# Patient Record
Sex: MALE | Race: BLACK OR AFRICAN AMERICAN | NOT HISPANIC OR LATINO | RURAL
[De-identification: names, ages, dates, MRNs, and addresses within clinical notes are randomized per-mention and may not be internally consistent; named-entity substitution may affect disease eponyms.]

---

## 2020-10-12 ENCOUNTER — HISTORICAL (OUTPATIENT)
Dept: ADMINISTRATIVE | Facility: HOSPITAL | Age: 45
End: 2020-10-12

## 2021-08-12 ENCOUNTER — OFFICE VISIT (OUTPATIENT)
Dept: FAMILY MEDICINE | Facility: CLINIC | Age: 46
End: 2021-08-12
Payer: COMMERCIAL

## 2021-08-12 VITALS
BODY MASS INDEX: 25.18 KG/M2 | RESPIRATION RATE: 16 BRPM | DIASTOLIC BLOOD PRESSURE: 98 MMHG | HEART RATE: 72 BPM | TEMPERATURE: 99 F | OXYGEN SATURATION: 97 % | SYSTOLIC BLOOD PRESSURE: 132 MMHG | WEIGHT: 170 LBS | HEIGHT: 69 IN

## 2021-08-12 DIAGNOSIS — Z20.822 LAB TEST NEGATIVE FOR COVID-19 VIRUS: ICD-10-CM

## 2021-08-12 DIAGNOSIS — Z11.52 ENCOUNTER FOR SCREENING FOR COVID-19: Primary | ICD-10-CM

## 2021-08-12 LAB
CTP QC/QA: YES
SARS-COV-2 AG RESP QL IA.RAPID: NEGATIVE

## 2021-08-12 PROCEDURE — 3075F PR MOST RECENT SYSTOLIC BLOOD PRESS GE 130-139MM HG: ICD-10-PCS | Mod: ,,, | Performed by: NURSE PRACTITIONER

## 2021-08-12 PROCEDURE — 3008F BODY MASS INDEX DOCD: CPT | Mod: ,,, | Performed by: NURSE PRACTITIONER

## 2021-08-12 PROCEDURE — 87426 SARS CORONAVIRUS 2 ANTIGEN POCT: ICD-10-PCS | Mod: QW,,, | Performed by: NURSE PRACTITIONER

## 2021-08-12 PROCEDURE — 3075F SYST BP GE 130 - 139MM HG: CPT | Mod: ,,, | Performed by: NURSE PRACTITIONER

## 2021-08-12 PROCEDURE — 1160F PR REVIEW ALL MEDS BY PRESCRIBER/CLIN PHARMACIST DOCUMENTED: ICD-10-PCS | Mod: ,,, | Performed by: NURSE PRACTITIONER

## 2021-08-12 PROCEDURE — 87426 SARSCOV CORONAVIRUS AG IA: CPT | Mod: QW,,, | Performed by: NURSE PRACTITIONER

## 2021-08-12 PROCEDURE — 1160F RVW MEDS BY RX/DR IN RCRD: CPT | Mod: ,,, | Performed by: NURSE PRACTITIONER

## 2021-08-12 PROCEDURE — 1159F MED LIST DOCD IN RCRD: CPT | Mod: ,,, | Performed by: NURSE PRACTITIONER

## 2021-08-12 PROCEDURE — 3008F PR BODY MASS INDEX (BMI) DOCUMENTED: ICD-10-PCS | Mod: ,,, | Performed by: NURSE PRACTITIONER

## 2021-08-12 PROCEDURE — 1159F PR MEDICATION LIST DOCUMENTED IN MEDICAL RECORD: ICD-10-PCS | Mod: ,,, | Performed by: NURSE PRACTITIONER

## 2021-08-12 PROCEDURE — 99212 OFFICE O/P EST SF 10 MIN: CPT | Mod: ,,, | Performed by: NURSE PRACTITIONER

## 2021-08-12 PROCEDURE — 99212 PR OFFICE/OUTPT VISIT, EST, LEVL II, 10-19 MIN: ICD-10-PCS | Mod: ,,, | Performed by: NURSE PRACTITIONER

## 2021-08-12 PROCEDURE — 3080F PR MOST RECENT DIASTOLIC BLOOD PRESSURE >= 90 MM HG: ICD-10-PCS | Mod: ,,, | Performed by: NURSE PRACTITIONER

## 2021-08-12 PROCEDURE — 3080F DIAST BP >= 90 MM HG: CPT | Mod: ,,, | Performed by: NURSE PRACTITIONER

## 2022-09-20 ENCOUNTER — OFFICE VISIT (OUTPATIENT)
Dept: FAMILY MEDICINE | Facility: CLINIC | Age: 47
End: 2022-09-20
Payer: COMMERCIAL

## 2022-09-20 VITALS
HEIGHT: 69 IN | OXYGEN SATURATION: 99 % | TEMPERATURE: 98 F | SYSTOLIC BLOOD PRESSURE: 138 MMHG | HEART RATE: 87 BPM | BODY MASS INDEX: 23.11 KG/M2 | WEIGHT: 156 LBS | DIASTOLIC BLOOD PRESSURE: 92 MMHG | RESPIRATION RATE: 18 BRPM

## 2022-09-20 DIAGNOSIS — J20.9 ACUTE BRONCHITIS, UNSPECIFIED ORGANISM: ICD-10-CM

## 2022-09-20 DIAGNOSIS — J01.90 ACUTE NON-RECURRENT SINUSITIS, UNSPECIFIED LOCATION: Primary | ICD-10-CM

## 2022-09-20 PROCEDURE — 1159F PR MEDICATION LIST DOCUMENTED IN MEDICAL RECORD: ICD-10-PCS | Mod: ,,, | Performed by: FAMILY MEDICINE

## 2022-09-20 PROCEDURE — 3075F SYST BP GE 130 - 139MM HG: CPT | Mod: ,,, | Performed by: FAMILY MEDICINE

## 2022-09-20 PROCEDURE — 1159F MED LIST DOCD IN RCRD: CPT | Mod: ,,, | Performed by: FAMILY MEDICINE

## 2022-09-20 PROCEDURE — 3080F DIAST BP >= 90 MM HG: CPT | Mod: ,,, | Performed by: FAMILY MEDICINE

## 2022-09-20 PROCEDURE — 3008F BODY MASS INDEX DOCD: CPT | Mod: ,,, | Performed by: FAMILY MEDICINE

## 2022-09-20 PROCEDURE — 99213 OFFICE O/P EST LOW 20 MIN: CPT | Mod: 25,,, | Performed by: FAMILY MEDICINE

## 2022-09-20 PROCEDURE — 3080F PR MOST RECENT DIASTOLIC BLOOD PRESSURE >= 90 MM HG: ICD-10-PCS | Mod: ,,, | Performed by: FAMILY MEDICINE

## 2022-09-20 PROCEDURE — 96372 THER/PROPH/DIAG INJ SC/IM: CPT | Mod: ,,, | Performed by: FAMILY MEDICINE

## 2022-09-20 PROCEDURE — 99213 PR OFFICE/OUTPT VISIT, EST, LEVL III, 20-29 MIN: ICD-10-PCS | Mod: 25,,, | Performed by: FAMILY MEDICINE

## 2022-09-20 PROCEDURE — 96372 PR INJECTION,THERAP/PROPH/DIAG2ST, IM OR SUBCUT: ICD-10-PCS | Mod: ,,, | Performed by: FAMILY MEDICINE

## 2022-09-20 PROCEDURE — 3075F PR MOST RECENT SYSTOLIC BLOOD PRESS GE 130-139MM HG: ICD-10-PCS | Mod: ,,, | Performed by: FAMILY MEDICINE

## 2022-09-20 PROCEDURE — 3008F PR BODY MASS INDEX (BMI) DOCUMENTED: ICD-10-PCS | Mod: ,,, | Performed by: FAMILY MEDICINE

## 2022-09-20 RX ORDER — DEXAMETHASONE SODIUM PHOSPHATE 4 MG/ML
6 INJECTION, SOLUTION INTRA-ARTICULAR; INTRALESIONAL; INTRAMUSCULAR; INTRAVENOUS; SOFT TISSUE
Status: COMPLETED | OUTPATIENT
Start: 2022-09-20 | End: 2022-09-20

## 2022-09-20 RX ORDER — AZITHROMYCIN 250 MG/1
TABLET, FILM COATED ORAL
Qty: 6 TABLET | Refills: 0 | Status: ON HOLD | OUTPATIENT
Start: 2022-09-20 | End: 2023-03-01 | Stop reason: CLARIF

## 2022-09-20 RX ORDER — PREDNISONE 20 MG/1
TABLET ORAL
Qty: 10 TABLET | Refills: 0 | Status: ON HOLD | OUTPATIENT
Start: 2022-09-20 | End: 2023-03-01 | Stop reason: CLARIF

## 2022-09-20 RX ADMIN — DEXAMETHASONE SODIUM PHOSPHATE 6 MG: 4 INJECTION, SOLUTION INTRA-ARTICULAR; INTRALESIONAL; INTRAMUSCULAR; INTRAVENOUS; SOFT TISSUE at 10:09

## 2022-09-20 NOTE — PROGRESS NOTES
Subjective:       Patient ID: Robert Sanders is a 46 y.o. male.    Chief Complaint: Cough and Sinus Problem (Patient has a cough and sinus pressure )    Congestion for over week.  Now coughing up a little green yellow sputum.  He had some sinus pressure initially but that is resolved.    Cough    Sinus Problem  Associated symptoms include coughing.   Review of Systems   Respiratory:  Positive for cough.        Objective:      Physical Exam  Constitutional:       Appearance: He is ill-appearing (mildly). He is not toxic-appearing.   HENT:      Right Ear: Tympanic membrane normal.      Left Ear: Tympanic membrane normal.      Nose: Congestion present.      Mouth/Throat:      Pharynx: No posterior oropharyngeal erythema.   Cardiovascular:      Rate and Rhythm: Normal rate and regular rhythm.   Pulmonary:      Effort: Pulmonary effort is normal.      Breath sounds: Rhonchi (few faint scattered rhonchi) present. No wheezing.   Neurological:      Mental Status: He is alert.       Assessment:       Problem List Items Addressed This Visit    None      Plan:       Call if not much better in 2 days

## 2022-12-07 ENCOUNTER — OFFICE VISIT (OUTPATIENT)
Dept: FAMILY MEDICINE | Facility: CLINIC | Age: 47
End: 2022-12-07
Payer: COMMERCIAL

## 2022-12-07 VITALS
SYSTOLIC BLOOD PRESSURE: 136 MMHG | HEIGHT: 69 IN | TEMPERATURE: 99 F | BODY MASS INDEX: 23.7 KG/M2 | OXYGEN SATURATION: 98 % | DIASTOLIC BLOOD PRESSURE: 90 MMHG | HEART RATE: 85 BPM | WEIGHT: 160 LBS

## 2022-12-07 DIAGNOSIS — J01.00 ACUTE NON-RECURRENT MAXILLARY SINUSITIS: Primary | ICD-10-CM

## 2022-12-07 DIAGNOSIS — F41.1 GAD (GENERALIZED ANXIETY DISORDER): ICD-10-CM

## 2022-12-07 DIAGNOSIS — R05.9 COUGH, UNSPECIFIED TYPE: ICD-10-CM

## 2022-12-07 PROCEDURE — 96372 PR INJECTION,THERAP/PROPH/DIAG2ST, IM OR SUBCUT: ICD-10-PCS | Mod: ,,, | Performed by: NURSE PRACTITIONER

## 2022-12-07 PROCEDURE — 99213 OFFICE O/P EST LOW 20 MIN: CPT | Mod: 25,,, | Performed by: NURSE PRACTITIONER

## 2022-12-07 PROCEDURE — 1160F RVW MEDS BY RX/DR IN RCRD: CPT | Mod: ,,, | Performed by: NURSE PRACTITIONER

## 2022-12-07 PROCEDURE — 1160F PR REVIEW ALL MEDS BY PRESCRIBER/CLIN PHARMACIST DOCUMENTED: ICD-10-PCS | Mod: ,,, | Performed by: NURSE PRACTITIONER

## 2022-12-07 PROCEDURE — 1159F PR MEDICATION LIST DOCUMENTED IN MEDICAL RECORD: ICD-10-PCS | Mod: ,,, | Performed by: NURSE PRACTITIONER

## 2022-12-07 PROCEDURE — 99213 PR OFFICE/OUTPT VISIT, EST, LEVL III, 20-29 MIN: ICD-10-PCS | Mod: 25,,, | Performed by: NURSE PRACTITIONER

## 2022-12-07 PROCEDURE — 3075F SYST BP GE 130 - 139MM HG: CPT | Mod: ,,, | Performed by: NURSE PRACTITIONER

## 2022-12-07 PROCEDURE — 3008F BODY MASS INDEX DOCD: CPT | Mod: ,,, | Performed by: NURSE PRACTITIONER

## 2022-12-07 PROCEDURE — 3008F PR BODY MASS INDEX (BMI) DOCUMENTED: ICD-10-PCS | Mod: ,,, | Performed by: NURSE PRACTITIONER

## 2022-12-07 PROCEDURE — 3075F PR MOST RECENT SYSTOLIC BLOOD PRESS GE 130-139MM HG: ICD-10-PCS | Mod: ,,, | Performed by: NURSE PRACTITIONER

## 2022-12-07 PROCEDURE — 3080F DIAST BP >= 90 MM HG: CPT | Mod: ,,, | Performed by: NURSE PRACTITIONER

## 2022-12-07 PROCEDURE — 3080F PR MOST RECENT DIASTOLIC BLOOD PRESSURE >= 90 MM HG: ICD-10-PCS | Mod: ,,, | Performed by: NURSE PRACTITIONER

## 2022-12-07 PROCEDURE — 1159F MED LIST DOCD IN RCRD: CPT | Mod: ,,, | Performed by: NURSE PRACTITIONER

## 2022-12-07 PROCEDURE — 96372 THER/PROPH/DIAG INJ SC/IM: CPT | Mod: ,,, | Performed by: NURSE PRACTITIONER

## 2022-12-07 RX ORDER — CLONAZEPAM 1 MG/1
1 TABLET ORAL DAILY PRN
Qty: 30 TABLET | Refills: 1 | Status: SHIPPED | OUTPATIENT
Start: 2022-12-07

## 2022-12-07 RX ORDER — DEXAMETHASONE SODIUM PHOSPHATE 4 MG/ML
8 INJECTION, SOLUTION INTRA-ARTICULAR; INTRALESIONAL; INTRAMUSCULAR; INTRAVENOUS; SOFT TISSUE
Status: COMPLETED | OUTPATIENT
Start: 2022-12-07 | End: 2022-12-07

## 2022-12-07 RX ORDER — METHYLPREDNISOLONE 4 MG/1
TABLET ORAL
Qty: 21 EACH | Refills: 0 | Status: SHIPPED | OUTPATIENT
Start: 2022-12-07 | End: 2022-12-28

## 2022-12-07 RX ORDER — IPRATROPIUM BROMIDE 42 UG/1
2 SPRAY, METERED NASAL 3 TIMES DAILY
Qty: 15 ML | Refills: 0 | Status: ON HOLD | OUTPATIENT
Start: 2022-12-07 | End: 2023-03-01 | Stop reason: CLARIF

## 2022-12-07 RX ORDER — BENZONATATE 200 MG/1
200 CAPSULE ORAL 3 TIMES DAILY PRN
Qty: 30 CAPSULE | Refills: 0 | Status: SHIPPED | OUTPATIENT
Start: 2022-12-07 | End: 2022-12-17

## 2022-12-07 RX ORDER — AMOXICILLIN AND CLAVULANATE POTASSIUM 875; 125 MG/1; MG/1
1 TABLET, FILM COATED ORAL EVERY 12 HOURS
Qty: 20 TABLET | Refills: 0 | Status: SHIPPED | OUTPATIENT
Start: 2022-12-07 | End: 2022-12-17

## 2022-12-07 RX ADMIN — DEXAMETHASONE SODIUM PHOSPHATE 8 MG: 4 INJECTION, SOLUTION INTRA-ARTICULAR; INTRALESIONAL; INTRAMUSCULAR; INTRAVENOUS; SOFT TISSUE at 10:12

## 2022-12-07 NOTE — PROGRESS NOTES
Subjective:       Patient ID: Robert Sanders is a 47 y.o. male.    Chief Complaint: Sinus Infection (Congestion, runny nose, prod cough, BA, chillls, felt warm yesterday. OTC meds taken with little to no help.)    Pt presents with sinus congestion for 3 days and follow up on JUSTIN.    Review of Systems   Constitutional: Negative.    HENT:  Positive for nasal congestion, rhinorrhea and sinus pressure/congestion.    Eyes: Negative.    Respiratory:  Positive for cough.    Cardiovascular: Negative.    Gastrointestinal: Negative.    Endocrine: Negative.    Genitourinary: Negative.    Musculoskeletal: Negative.    Integumentary:  Negative.   Allergic/Immunologic: Negative.    Neurological: Negative.    Hematological: Negative.    Psychiatric/Behavioral: Negative.         Objective:      Physical Exam  Vitals and nursing note reviewed.   Constitutional:       General: He is not in acute distress.     Appearance: Normal appearance. He is not ill-appearing.   HENT:      Head: Normocephalic.      Right Ear: External ear normal.      Left Ear: External ear normal.      Nose: Congestion and rhinorrhea present.      Right Sinus: Maxillary sinus tenderness present.      Left Sinus: Maxillary sinus tenderness present.      Mouth/Throat:      Mouth: Mucous membranes are moist.      Pharynx: Posterior oropharyngeal erythema present.   Eyes:      Conjunctiva/sclera: Conjunctivae normal.   Cardiovascular:      Rate and Rhythm: Normal rate and regular rhythm.      Pulses: Normal pulses.      Heart sounds: Normal heart sounds. No murmur heard.    No friction rub. No gallop.   Pulmonary:      Effort: Pulmonary effort is normal. No respiratory distress.      Breath sounds: Normal breath sounds. No stridor. No wheezing, rhonchi or rales.   Chest:      Chest wall: No tenderness.   Abdominal:      General: Abdomen is flat. There is no distension.   Musculoskeletal:         General: No swelling or tenderness. Normal range of motion.       Cervical back: Neck supple.      Right lower leg: No edema.      Left lower leg: No edema.   Skin:     General: Skin is warm and dry.      Coloration: Skin is not jaundiced or pale.      Findings: No erythema or rash.   Neurological:      General: No focal deficit present.      Mental Status: He is alert and oriented to person, place, and time. Mental status is at baseline.      Motor: No weakness.      Gait: Gait normal.   Psychiatric:         Mood and Affect: Mood normal.         Behavior: Behavior normal.         Thought Content: Thought content normal.         Judgment: Judgment normal.       Assessment:       Problem List Items Addressed This Visit    None  Visit Diagnoses       Acute non-recurrent maxillary sinusitis    -  Primary    Relevant Medications    dexAMETHasone injection 8 mg (Completed)    amoxicillin-clavulanate 875-125mg (AUGMENTIN) 875-125 mg per tablet    methylPREDNISolone (MEDROL DOSEPACK) 4 mg tablet    ipratropium (ATROVENT) 42 mcg (0.06 %) nasal spray    Cough, unspecified type        Relevant Medications    dexAMETHasone injection 8 mg (Completed)    methylPREDNISolone (MEDROL DOSEPACK) 4 mg tablet    benzonatate (TESSALON) 200 MG capsule    JUSTIN (generalized anxiety disorder)        Relevant Medications    clonazePAM (KLONOPIN) 1 MG tablet              Plan:       Treat sinusitis as above  Refill clonazepam prn; pt has occasional panic attacks/high anxiety; f/u with pcp advised.

## 2023-01-31 ENCOUNTER — OFFICE VISIT (OUTPATIENT)
Dept: FAMILY MEDICINE | Facility: CLINIC | Age: 48
End: 2023-01-31
Payer: COMMERCIAL

## 2023-01-31 VITALS
HEART RATE: 78 BPM | WEIGHT: 157 LBS | BODY MASS INDEX: 23.25 KG/M2 | DIASTOLIC BLOOD PRESSURE: 98 MMHG | SYSTOLIC BLOOD PRESSURE: 163 MMHG | OXYGEN SATURATION: 98 % | HEIGHT: 69 IN

## 2023-01-31 DIAGNOSIS — J32.9 SINUSITIS, UNSPECIFIED CHRONICITY, UNSPECIFIED LOCATION: Primary | ICD-10-CM

## 2023-01-31 PROCEDURE — 1160F RVW MEDS BY RX/DR IN RCRD: CPT | Mod: ,,, | Performed by: FAMILY MEDICINE

## 2023-01-31 PROCEDURE — 96372 PR INJECTION,THERAP/PROPH/DIAG2ST, IM OR SUBCUT: ICD-10-PCS | Mod: ,,, | Performed by: FAMILY MEDICINE

## 2023-01-31 PROCEDURE — 96372 THER/PROPH/DIAG INJ SC/IM: CPT | Mod: ,,, | Performed by: FAMILY MEDICINE

## 2023-01-31 PROCEDURE — 1159F PR MEDICATION LIST DOCUMENTED IN MEDICAL RECORD: ICD-10-PCS | Mod: ,,, | Performed by: FAMILY MEDICINE

## 2023-01-31 PROCEDURE — 1159F MED LIST DOCD IN RCRD: CPT | Mod: ,,, | Performed by: FAMILY MEDICINE

## 2023-01-31 PROCEDURE — 3077F SYST BP >= 140 MM HG: CPT | Mod: ,,, | Performed by: FAMILY MEDICINE

## 2023-01-31 PROCEDURE — 1160F PR REVIEW ALL MEDS BY PRESCRIBER/CLIN PHARMACIST DOCUMENTED: ICD-10-PCS | Mod: ,,, | Performed by: FAMILY MEDICINE

## 2023-01-31 PROCEDURE — 3077F PR MOST RECENT SYSTOLIC BLOOD PRESSURE >= 140 MM HG: ICD-10-PCS | Mod: ,,, | Performed by: FAMILY MEDICINE

## 2023-01-31 PROCEDURE — 99214 PR OFFICE/OUTPT VISIT, EST, LEVL IV, 30-39 MIN: ICD-10-PCS | Mod: 25,,, | Performed by: FAMILY MEDICINE

## 2023-01-31 PROCEDURE — 3080F DIAST BP >= 90 MM HG: CPT | Mod: ,,, | Performed by: FAMILY MEDICINE

## 2023-01-31 PROCEDURE — 99214 OFFICE O/P EST MOD 30 MIN: CPT | Mod: 25,,, | Performed by: FAMILY MEDICINE

## 2023-01-31 PROCEDURE — 3080F PR MOST RECENT DIASTOLIC BLOOD PRESSURE >= 90 MM HG: ICD-10-PCS | Mod: ,,, | Performed by: FAMILY MEDICINE

## 2023-01-31 PROCEDURE — 3008F BODY MASS INDEX DOCD: CPT | Mod: ,,, | Performed by: FAMILY MEDICINE

## 2023-01-31 PROCEDURE — 3008F PR BODY MASS INDEX (BMI) DOCUMENTED: ICD-10-PCS | Mod: ,,, | Performed by: FAMILY MEDICINE

## 2023-01-31 RX ORDER — CEFTRIAXONE 1 G/1
1 INJECTION, POWDER, FOR SOLUTION INTRAMUSCULAR; INTRAVENOUS
Status: COMPLETED | OUTPATIENT
Start: 2023-01-31 | End: 2023-01-31

## 2023-01-31 RX ORDER — PREDNISONE 20 MG/1
40 TABLET ORAL DAILY
Qty: 10 TABLET | Refills: 0 | Status: SHIPPED | OUTPATIENT
Start: 2023-01-31 | End: 2023-02-05

## 2023-01-31 RX ORDER — DEXAMETHASONE SODIUM PHOSPHATE 4 MG/ML
6 INJECTION, SOLUTION INTRA-ARTICULAR; INTRALESIONAL; INTRAMUSCULAR; INTRAVENOUS; SOFT TISSUE
Status: COMPLETED | OUTPATIENT
Start: 2023-01-31 | End: 2023-01-31

## 2023-01-31 RX ORDER — AMOXICILLIN AND CLAVULANATE POTASSIUM 875; 125 MG/1; MG/1
1 TABLET, FILM COATED ORAL 2 TIMES DAILY
Qty: 14 TABLET | Refills: 0 | Status: SHIPPED | OUTPATIENT
Start: 2023-01-31 | End: 2023-02-07

## 2023-01-31 RX ADMIN — DEXAMETHASONE SODIUM PHOSPHATE 6 MG: 4 INJECTION, SOLUTION INTRA-ARTICULAR; INTRALESIONAL; INTRAMUSCULAR; INTRAVENOUS; SOFT TISSUE at 02:01

## 2023-01-31 RX ADMIN — CEFTRIAXONE 1 G: 1 INJECTION, POWDER, FOR SOLUTION INTRAMUSCULAR; INTRAVENOUS at 02:01

## 2023-01-31 NOTE — PROGRESS NOTES
Subjective:       Patient ID: Robert Sanders is a 47 y.o. male.    Chief Complaint: Sinusitis (Patient thinks he has a sinus infection, really congested, eye pressure, casual cough, really thick mucus, feeling fatigue. Stared taking dayquil last Thursday and it hasn't gotten any better.)    HPI  Review of Systems   Constitutional:  Negative for activity change, appetite change, chills, diaphoresis, fatigue, fever and unexpected weight change.   HENT:  Positive for nasal congestion, postnasal drip, rhinorrhea and sinus pressure/congestion. Negative for dental problem, drooling, ear discharge, ear pain, facial swelling, hearing loss, mouth sores, nosebleeds, sneezing, sore throat, tinnitus, trouble swallowing, voice change and goiter.    Eyes:  Negative for photophobia, pain, discharge, redness, itching and visual disturbance.   Respiratory:  Positive for cough. Negative for apnea, choking, chest tightness, shortness of breath, wheezing and stridor.    Cardiovascular:  Negative for chest pain, palpitations, leg swelling and claudication.   Gastrointestinal:  Negative for abdominal distention, abdominal pain, anal bleeding, blood in stool, change in bowel habit, constipation, diarrhea, nausea, vomiting, reflux, fecal incontinence and change in bowel habit.   Endocrine: Negative for cold intolerance, heat intolerance, polydipsia, polyphagia and polyuria.   Genitourinary:  Negative for bladder incontinence, decreased urine volume, difficulty urinating, discharge, dysuria, enuresis, erectile dysfunction, flank pain, frequency, genital sores, hematuria, penile pain, testicular pain and urgency.   Musculoskeletal:  Negative for arthralgias, back pain, gait problem, joint swelling, leg pain, myalgias, neck pain, neck stiffness and joint deformity.   Integumentary:  Negative for pallor, rash, wound and mole/lesion.   Allergic/Immunologic: Negative for environmental allergies, food allergies and frequent infections.    Neurological:  Negative for dizziness, vertigo, tremors, seizures, syncope, facial asymmetry, speech difficulty, weakness, light-headedness, numbness, headaches, coordination difficulties, memory loss and coordination difficulties.   Hematological:  Negative for adenopathy. Does not bruise/bleed easily.   Psychiatric/Behavioral:  Negative for agitation, behavioral problems, confusion, decreased concentration, dysphoric mood, hallucinations, self-injury, sleep disturbance and suicidal ideas. The patient is not nervous/anxious and is not hyperactive.        Objective:      Physical Exam  Vitals reviewed.   Constitutional:       Appearance: Normal appearance. He is normal weight.   HENT:      Head: Normocephalic and atraumatic.      Right Ear: Tympanic membrane and ear canal normal.      Left Ear: Tympanic membrane, ear canal and external ear normal.      Nose: Congestion and rhinorrhea present.      Mouth/Throat:      Mouth: Mucous membranes are moist.      Pharynx: Oropharynx is clear. Posterior oropharyngeal erythema present.   Eyes:      Extraocular Movements: Extraocular movements intact.      Conjunctiva/sclera: Conjunctivae normal.      Pupils: Pupils are equal, round, and reactive to light.   Cardiovascular:      Rate and Rhythm: Normal rate and regular rhythm.      Pulses: Normal pulses.      Heart sounds: Normal heart sounds.   Pulmonary:      Effort: Pulmonary effort is normal.      Breath sounds: Normal breath sounds.   Abdominal:      General: Abdomen is flat. Bowel sounds are normal.      Palpations: Abdomen is soft.   Musculoskeletal:         General: Normal range of motion.      Cervical back: Normal range of motion and neck supple.   Skin:     General: Skin is warm and dry.   Neurological:      General: No focal deficit present.      Mental Status: He is alert and oriented to person, place, and time. Mental status is at baseline.   Psychiatric:         Mood and Affect: Mood normal.         Behavior:  Behavior normal.         Thought Content: Thought content normal.         Judgment: Judgment normal.       Assessment:       1. Sinusitis, unspecified chronicity, unspecified location        Plan:     Sinusitis, unspecified chronicity, unspecified location  -     cefTRIAXone injection 1 g  -     dexAMETHasone injection 6 mg  -     amoxicillin-clavulanate 875-125mg (AUGMENTIN) 875-125 mg per tablet; Take 1 tablet by mouth 2 (two) times a day. for 7 days  Dispense: 14 tablet; Refill: 0  -     predniSONE (DELTASONE) 20 MG tablet; Take 2 tablets (40 mg total) by mouth once daily. for 5 days  Dispense: 10 tablet; Refill: 0

## 2023-02-06 ENCOUNTER — OFFICE VISIT (OUTPATIENT)
Dept: FAMILY MEDICINE | Facility: CLINIC | Age: 48
End: 2023-02-06
Payer: COMMERCIAL

## 2023-02-06 VITALS
SYSTOLIC BLOOD PRESSURE: 160 MMHG | DIASTOLIC BLOOD PRESSURE: 94 MMHG | RESPIRATION RATE: 18 BRPM | HEART RATE: 88 BPM | WEIGHT: 156 LBS | OXYGEN SATURATION: 98 % | TEMPERATURE: 99 F | HEIGHT: 69 IN | BODY MASS INDEX: 23.11 KG/M2

## 2023-02-06 DIAGNOSIS — M25.562 ACUTE PAIN OF LEFT KNEE: Primary | ICD-10-CM

## 2023-02-06 PROCEDURE — 1159F MED LIST DOCD IN RCRD: CPT | Mod: ,,, | Performed by: FAMILY MEDICINE

## 2023-02-06 PROCEDURE — 99213 OFFICE O/P EST LOW 20 MIN: CPT | Mod: ,,, | Performed by: FAMILY MEDICINE

## 2023-02-06 PROCEDURE — 1159F PR MEDICATION LIST DOCUMENTED IN MEDICAL RECORD: ICD-10-PCS | Mod: ,,, | Performed by: FAMILY MEDICINE

## 2023-02-06 PROCEDURE — 3008F BODY MASS INDEX DOCD: CPT | Mod: ,,, | Performed by: FAMILY MEDICINE

## 2023-02-06 PROCEDURE — 3008F PR BODY MASS INDEX (BMI) DOCUMENTED: ICD-10-PCS | Mod: ,,, | Performed by: FAMILY MEDICINE

## 2023-02-06 PROCEDURE — 99213 PR OFFICE/OUTPT VISIT, EST, LEVL III, 20-29 MIN: ICD-10-PCS | Mod: ,,, | Performed by: FAMILY MEDICINE

## 2023-02-06 PROCEDURE — 3077F PR MOST RECENT SYSTOLIC BLOOD PRESSURE >= 140 MM HG: ICD-10-PCS | Mod: ,,, | Performed by: FAMILY MEDICINE

## 2023-02-06 PROCEDURE — 3080F DIAST BP >= 90 MM HG: CPT | Mod: ,,, | Performed by: FAMILY MEDICINE

## 2023-02-06 PROCEDURE — 3080F PR MOST RECENT DIASTOLIC BLOOD PRESSURE >= 90 MM HG: ICD-10-PCS | Mod: ,,, | Performed by: FAMILY MEDICINE

## 2023-02-06 PROCEDURE — 3077F SYST BP >= 140 MM HG: CPT | Mod: ,,, | Performed by: FAMILY MEDICINE

## 2023-02-06 NOTE — PROGRESS NOTES
Subjective:       Patient ID: Robert Sanders is a 47 y.o. male.    Chief Complaint: Knee Pain (Left knee kira. Knee went out on him Saturday)    Patient said he was walking in his left knee gave out.  This occurred 2 days ago.  He does not recall the details.  He said he was simply walking.  He is never had trouble with his knee before.  Now with sharp pain medial aspect of his knee with certain activity.  Always when walking.  He said if he twists his foot a certain way he has severe pain.    Knee Pain     Review of Systems      Objective:      Physical Exam  Constitutional:       General: He is in acute distress.      Appearance: He is not ill-appearing.   Musculoskeletal:      Left knee: No swelling, ecchymosis or crepitus. Tenderness present over the MCL. No LCL laxity or MCL laxity.Normal alignment and normal patellar mobility.      Instability Tests: Anterior drawer test negative. Posterior drawer test negative. Medial Jessie test negative and lateral Jessie test negative.      Right lower leg: No edema.      Left lower leg: No swelling. No edema.      Comments: Negative Tanya.  Surprisingly   Skin:     Findings: No lesion or rash.   Neurological:      Mental Status: He is alert.       Assessment:       Problem List Items Addressed This Visit    None  Visit Diagnoses       Acute pain of left knee    -  Primary    Relevant Orders    X-Ray Knee 1 or 2 View Left (Completed)    Ambulatory referral/consult to Orthopedics            Plan:       Knee x-ray is normal.  Likely meniscal injury.  Refer to orthopedics.  Ibuprofen as needed.

## 2023-02-14 ENCOUNTER — OFFICE VISIT (OUTPATIENT)
Dept: ORTHOPEDICS | Facility: CLINIC | Age: 48
End: 2023-02-14
Payer: COMMERCIAL

## 2023-02-14 DIAGNOSIS — M25.562 ACUTE PAIN OF LEFT KNEE: ICD-10-CM

## 2023-02-14 DIAGNOSIS — M25.552 LEFT HIP PAIN: Primary | ICD-10-CM

## 2023-02-14 PROCEDURE — 99203 PR OFFICE/OUTPT VISIT, NEW, LEVL III, 30-44 MIN: ICD-10-PCS | Mod: S$PBB,,, | Performed by: NURSE PRACTITIONER

## 2023-02-14 PROCEDURE — 99203 OFFICE O/P NEW LOW 30 MIN: CPT | Mod: S$PBB,,, | Performed by: NURSE PRACTITIONER

## 2023-02-14 PROCEDURE — 99213 OFFICE O/P EST LOW 20 MIN: CPT | Mod: PBBFAC | Performed by: NURSE PRACTITIONER

## 2023-02-14 NOTE — PROGRESS NOTES
HPI:   Robert Sanders is a pleasant 47 y.o. patient who reports to clinic for evaluation of knee pain.  Reports pain started about 2 weeks ago.  He had a fall landing on his left knee.  Reports he was unable to walk or bear weight after.  Has been using crutches.  He does not feel like the pain has improved in the past 2 weeks.  Denies swelling.  Reports most of his pain  is medially  Injury onset and description: fall   Patient's occupation:   This is not a work related injury.   This injury has been non-responsive to conservative care. The pain is worse with repetitive use, and strenuous activity is very difficult.  his pain improves with rest.  he rates pain as a  4/10on the Visual Analog Scale.        PAST MEDICAL HISTORY:   No past medical history on file.  PAST SURGICAL HISTORY:   No past surgical history on file.  MEDICATIONS:    Current Outpatient Medications:     azithromycin (ZITHROMAX Z-MELA) 250 MG tablet, Two today then 1 daily, Disp: 6 tablet, Rfl: 0    clonazePAM (KLONOPIN) 1 MG tablet, Take 1 tablet (1 mg total) by mouth daily as needed for Anxiety., Disp: 30 tablet, Rfl: 1    ipratropium (ATROVENT) 42 mcg (0.06 %) nasal spray, 2 sprays by Each Nostril route 3 (three) times daily., Disp: 15 mL, Rfl: 0    predniSONE (DELTASONE) 20 MG tablet, Two every morning, Disp: 10 tablet, Rfl: 0  ALLERGIES:   Review of patient's allergies indicates:  No Known Allergies      PHYSICAL EXAM:  VITAL SIGNS: There were no vitals taken for this visit.  General: Well-developed well-nourished 47 y.o. malein no acute distress;Cardiovascular: Regular rhythm by palpation of distal pulse, normal color and temperature, no concerning varicosities on symptomatic side Lungs: No labored breathing or wheezing appreciated Neuro: Alert and oriented ×3 Psychiatric: well oriented to person, place and time, demonstrates normal mood and affect Skin: No rashes, lesions or ulcers, normal temperature, turgor, and texture on  uninvolved extremity    General    Constitutional: He is oriented to person, place, and time. He appears well-nourished.   HENT:   Head: Normocephalic and atraumatic.   Eyes: Pupils are equal, round, and reactive to light.   Neck: Neck supple.   Cardiovascular:  Normal rate and regular rhythm.            Pulmonary/Chest: Effort normal. No respiratory distress.   Abdominal: There is no abdominal tenderness. There is no guarding.   Neurological: He is alert and oriented to person, place, and time. He has normal reflexes.   Psychiatric: He has a normal mood and affect. His behavior is normal. Judgment and thought content normal.             Left Knee Exam     Inspection   Swelling: absent  Effusion: absent  Bruising: absent    Tenderness   The patient tender to palpation of the medial joint line and lateral joint line.    Range of Motion   Extension:  abnormal   Flexion:  abnormal     Tests   Meniscus   Jessie:  Medial - positive Lateral - positive  Stability   Lachman: normal (-1 to 2mm)   PCL-Posterior Drawer: normal (0 to 2mm)  Patella   Patellar apprehension: negative  Patellar Tracking: normal    Other   Sensation: normal    Muscle Strength   Left Lower Extremity   Quadriceps:  4/5   Hamstrin/5     Vascular Exam       Left Pulses  Dorsalis Pedis:      2+  Posterior Tibial:      2+        IMAGING:  X-Ray Knee 1 or 2 View Left    Result Date: 2023  EXAMINATION: XR KNEE 1 OR 2 VIEW LEFT CLINICAL HISTORY: Pain in left knee COMPARISON: None. FINDINGS: Left knee, PA and lateral views: The joint spaces are normal.  There is no evidence of any effusion or fracture.     Negative exam. [facility] of service: [facility] Electronically signed by: Anika Herbert Date:    2023 Time:    13:22        ASSESSMENT:      ICD-10-CM ICD-9-CM   1. Acute pain of left knee  M25.562 719.46       PLAN:     -Findings and treatment options were discussed with the patient  -All questions answered  MRI left knee  RTC  with Dr Zelaya after to review results  OTC NSAIDs as needed    There are no Patient Instructions on file for this visit.  No orders of the defined types were placed in this encounter.    Procedures

## 2023-02-21 ENCOUNTER — OFFICE VISIT (OUTPATIENT)
Dept: ORTHOPEDICS | Facility: CLINIC | Age: 48
End: 2023-02-21
Payer: COMMERCIAL

## 2023-02-21 DIAGNOSIS — S83.232A COMPLEX TEAR OF MEDIAL MENISCUS OF LEFT KNEE AS CURRENT INJURY, INITIAL ENCOUNTER: Primary | ICD-10-CM

## 2023-02-21 PROCEDURE — 99214 PR OFFICE/OUTPT VISIT, EST, LEVL IV, 30-39 MIN: ICD-10-PCS | Mod: S$PBB,,, | Performed by: ORTHOPAEDIC SURGERY

## 2023-02-21 PROCEDURE — 99212 OFFICE O/P EST SF 10 MIN: CPT | Mod: PBBFAC | Performed by: ORTHOPAEDIC SURGERY

## 2023-02-21 PROCEDURE — 99214 OFFICE O/P EST MOD 30 MIN: CPT | Mod: S$PBB,,, | Performed by: ORTHOPAEDIC SURGERY

## 2023-02-21 NOTE — PROGRESS NOTES
CC:  Knee pain    47 y.o. Male returns to clinic for a follow up visit regarding knee pain.       Pt is here for MRI f/u left knee and treatment moving forward.       No past medical history on file.  No past surgical history on file.      PHYSICAL EXAMINATION:  There were no vitals taken for this visit.  General    Constitutional: He is oriented to person, place, and time. He appears well-developed and well-nourished.   HENT:   Head: Normocephalic and atraumatic.   Nose: Nose normal.   Eyes: EOM are normal. Pupils are equal, round, and reactive to light.   Cardiovascular:  Normal rate and intact distal pulses.            Pulmonary/Chest: Effort normal. No respiratory distress. He exhibits no tenderness.   Abdominal: Soft. He exhibits no distension. There is no abdominal tenderness.   Neurological: He is alert and oriented to person, place, and time. He has normal reflexes.   Psychiatric: He has a normal mood and affect. His behavior is normal. Judgment and thought content normal.             Left Knee Exam     Inspection   Swelling: present  Effusion: present  Deformity: absent    Tenderness   The patient tender to palpation of the medial joint line.    Crepitus   The patient has crepitus of the medial joint line.    Range of Motion   Extension:  abnormal   Flexion:  abnormal     Tests   Meniscus   Jessie:  Medial - positive     Other   Meniscal Cyst: present  Popliteal (Baker's) Cyst: absent    Comments:  Pain with Thessaly Maneuver.       IMAGING:  X-Ray Knee 1 or 2 View Left    Result Date: 2/6/2023  EXAMINATION: XR KNEE 1 OR 2 VIEW LEFT CLINICAL HISTORY: Pain in left knee COMPARISON: None. FINDINGS: Left knee, PA and lateral views: The joint spaces are normal.  There is no evidence of any effusion or fracture.     Negative exam. Place of service: [facility] Electronically signed by: Anika Herbert Date:    02/06/2023 Time:    13:22       I reviewed his MRI from the imaging center.  This  demonstrates the presence of a complex tear of the posterior horn of the medial meniscus.  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 Complex tear of medial meniscus of left knee as current injury, initial encounter  S83.232A 836.0       PLAN:     -Findings and treatment options were discussed with the patient  -All questions answered  The conservative options including NSAIDs, activity modification, physical therapy, corticosteroid injection, and viscosupplimentation/ Platelet rich plasma injections were discussed. he is interested in surgical intervention at this time, as conservative measures up to this point have not fully relieved or resolved his symptoms.    The surgical process of knee arthroscopy was discussed in detail with the patient including a detailed discussion of the procedure itself (including visual model, x-ray review, and literature review). The typical perioperative and post-operative course was discussed and perioperative risks were discussed to the patient's satisfaction.  Risks and complications discussed included but were not limited to the risks of anesthetic complications, infection, bleeding, wound healing complications, instability, limb length inequality, neurologic dysfunction including numbness,  DVT, pulmonary embolism, perioperative medical risks (cardiac, pulmonary, renal, neurologic), and death and the patient elects to proceed. We will initiate pre-operative medical evaluation and set a provisional date for surgical intervention according to the patient's schedule. 25 minutes was spent in direct consultation with the patient counselling him on the items listed above.  Will plan for repair of this complex medial meniscus tear.  Plan will be left knee arthroscopy.    There are no Patient Instructions on file for this  visit.      No orders of the defined types were placed in this encounter.        Procedures

## 2023-02-22 DIAGNOSIS — S83.232A COMPLEX TEAR OF MEDIAL MENISCUS OF LEFT KNEE AS CURRENT INJURY, INITIAL ENCOUNTER: Primary | ICD-10-CM

## 2023-02-22 DIAGNOSIS — S83.232A COMPLEX TEAR OF MEDIAL MENISCUS OF LEFT KNEE: ICD-10-CM

## 2023-02-22 DIAGNOSIS — Z01.818 PREPROCEDURAL EXAMINATION: Primary | ICD-10-CM

## 2023-02-22 RX ORDER — MUPIROCIN 20 MG/G
OINTMENT TOPICAL
Status: CANCELLED | OUTPATIENT
Start: 2023-02-22

## 2023-02-22 RX ORDER — SODIUM CHLORIDE 9 MG/ML
INJECTION, SOLUTION INTRAVENOUS CONTINUOUS
Status: CANCELLED | OUTPATIENT
Start: 2023-02-22

## 2023-02-24 ENCOUNTER — HOSPITAL ENCOUNTER (OUTPATIENT)
Dept: CARDIOLOGY | Facility: HOSPITAL | Age: 48
Discharge: HOME | End: 2023-02-24
Attending: ORTHOPAEDIC SURGERY
Payer: COMMERCIAL

## 2023-02-24 DIAGNOSIS — Z01.818 PREPROCEDURAL EXAMINATION: ICD-10-CM

## 2023-02-24 PROCEDURE — 93010 ELECTROCARDIOGRAM REPORT: CPT | Mod: ,,, | Performed by: HOSPITALIST

## 2023-02-24 PROCEDURE — 93010 EKG 12-LEAD: ICD-10-PCS | Mod: ,,, | Performed by: HOSPITALIST

## 2023-02-24 PROCEDURE — 93005 ELECTROCARDIOGRAM TRACING: CPT

## 2023-03-01 ENCOUNTER — ANESTHESIA (OUTPATIENT)
Dept: SURGERY | Facility: HOSPITAL | Age: 48
End: 2023-03-01
Payer: COMMERCIAL

## 2023-03-01 ENCOUNTER — HOSPITAL ENCOUNTER (OUTPATIENT)
Facility: HOSPITAL | Age: 48
Discharge: HOME | End: 2023-03-01
Attending: ORTHOPAEDIC SURGERY | Admitting: ORTHOPAEDIC SURGERY
Payer: COMMERCIAL

## 2023-03-01 ENCOUNTER — ANESTHESIA EVENT (OUTPATIENT)
Dept: SURGERY | Facility: HOSPITAL | Age: 48
End: 2023-03-01
Payer: COMMERCIAL

## 2023-03-01 VITALS
SYSTOLIC BLOOD PRESSURE: 145 MMHG | WEIGHT: 160 LBS | DIASTOLIC BLOOD PRESSURE: 91 MMHG | TEMPERATURE: 98 F | HEIGHT: 69 IN | HEART RATE: 77 BPM | BODY MASS INDEX: 23.7 KG/M2 | RESPIRATION RATE: 11 BRPM | OXYGEN SATURATION: 94 %

## 2023-03-01 DIAGNOSIS — S83.232A COMPLEX TEAR OF MEDIAL MENISCUS OF LEFT KNEE AS CURRENT INJURY, INITIAL ENCOUNTER: ICD-10-CM

## 2023-03-01 DIAGNOSIS — S83.232A COMPLEX TEAR OF MEDIAL MENISCUS OF LEFT KNEE: Primary | ICD-10-CM

## 2023-03-01 PROCEDURE — 25000003 PHARM REV CODE 250: Performed by: ORTHOPAEDIC SURGERY

## 2023-03-01 PROCEDURE — 25000242 PHARM REV CODE 250 ALT 637 W/ HCPCS

## 2023-03-01 PROCEDURE — 71000015 HC POSTOP RECOV 1ST HR: Performed by: ORTHOPAEDIC SURGERY

## 2023-03-01 PROCEDURE — 36000710: Performed by: ORTHOPAEDIC SURGERY

## 2023-03-01 PROCEDURE — D9220A PRA ANESTHESIA: ICD-10-PCS | Mod: CRNA,,,

## 2023-03-01 PROCEDURE — D9220A PRA ANESTHESIA: ICD-10-PCS | Mod: ANES,,, | Performed by: ANESTHESIOLOGY

## 2023-03-01 PROCEDURE — 27000655: Performed by: ANESTHESIOLOGY

## 2023-03-01 PROCEDURE — 37000008 HC ANESTHESIA 1ST 15 MINUTES: Performed by: ORTHOPAEDIC SURGERY

## 2023-03-01 PROCEDURE — 27000510 HC BLANKET BAIR HUGGER ANY SIZE: Performed by: ANESTHESIOLOGY

## 2023-03-01 PROCEDURE — 63600175 PHARM REV CODE 636 W HCPCS

## 2023-03-01 PROCEDURE — 63600175 PHARM REV CODE 636 W HCPCS: Performed by: ORTHOPAEDIC SURGERY

## 2023-03-01 PROCEDURE — 97161 PT EVAL LOW COMPLEX 20 MIN: CPT

## 2023-03-01 PROCEDURE — 63600175 PHARM REV CODE 636 W HCPCS: Performed by: ANESTHESIOLOGY

## 2023-03-01 PROCEDURE — 71000033 HC RECOVERY, INTIAL HOUR: Performed by: ORTHOPAEDIC SURGERY

## 2023-03-01 PROCEDURE — 37000009 HC ANESTHESIA EA ADD 15 MINS: Performed by: ORTHOPAEDIC SURGERY

## 2023-03-01 PROCEDURE — 25000003 PHARM REV CODE 250

## 2023-03-01 PROCEDURE — 36000711: Performed by: ORTHOPAEDIC SURGERY

## 2023-03-01 PROCEDURE — 27000716 HC OXISENSOR PROBE, ANY SIZE: Performed by: ANESTHESIOLOGY

## 2023-03-01 PROCEDURE — 27201423 OPTIME MED/SURG SUP & DEVICES STERILE SUPPLY: Performed by: ORTHOPAEDIC SURGERY

## 2023-03-01 PROCEDURE — 29881 ARTHRS KNE SRG MNISECTMY M/L: CPT | Mod: LT,,, | Performed by: ORTHOPAEDIC SURGERY

## 2023-03-01 PROCEDURE — 27000177 HC AIRWAY, LARYNGEAL MASK: Performed by: ANESTHESIOLOGY

## 2023-03-01 PROCEDURE — D9220A PRA ANESTHESIA: Mod: ANES,,, | Performed by: ANESTHESIOLOGY

## 2023-03-01 PROCEDURE — 71000016 HC POSTOP RECOV ADDL HR: Performed by: ORTHOPAEDIC SURGERY

## 2023-03-01 PROCEDURE — 29881 PR KNEE SCOPE SINGLE MENISECECTOMY: ICD-10-PCS | Mod: LT,,, | Performed by: ORTHOPAEDIC SURGERY

## 2023-03-01 PROCEDURE — D9220A PRA ANESTHESIA: Mod: CRNA,,,

## 2023-03-01 RX ORDER — OMEPRAZOLE 20 MG/1
20 CAPSULE, DELAYED RELEASE ORAL DAILY
COMMUNITY

## 2023-03-01 RX ORDER — FENTANYL CITRATE 50 UG/ML
INJECTION, SOLUTION INTRAMUSCULAR; INTRAVENOUS
Status: DISCONTINUED | OUTPATIENT
Start: 2023-03-01 | End: 2023-03-01

## 2023-03-01 RX ORDER — BUPIVACAINE HYDROCHLORIDE 2.5 MG/ML
INJECTION, SOLUTION EPIDURAL; INFILTRATION; INTRACAUDAL
Status: DISCONTINUED | OUTPATIENT
Start: 2023-03-01 | End: 2023-03-01 | Stop reason: HOSPADM

## 2023-03-01 RX ORDER — MIDAZOLAM HYDROCHLORIDE 1 MG/ML
INJECTION INTRAMUSCULAR; INTRAVENOUS
Status: DISCONTINUED | OUTPATIENT
Start: 2023-03-01 | End: 2023-03-01

## 2023-03-01 RX ORDER — ONDANSETRON 4 MG/1
4 TABLET, ORALLY DISINTEGRATING ORAL EVERY 6 HOURS PRN
Qty: 30 TABLET | Refills: 0 | Status: SHIPPED | OUTPATIENT
Start: 2023-03-01 | End: 2023-08-23 | Stop reason: ALTCHOICE

## 2023-03-01 RX ORDER — MORPHINE SULFATE 10 MG/ML
4 INJECTION INTRAMUSCULAR; INTRAVENOUS; SUBCUTANEOUS EVERY 5 MIN PRN
Status: DISCONTINUED | OUTPATIENT
Start: 2023-03-01 | End: 2023-03-01 | Stop reason: HOSPADM

## 2023-03-01 RX ORDER — HYDROMORPHONE HYDROCHLORIDE 2 MG/ML
0.5 INJECTION, SOLUTION INTRAMUSCULAR; INTRAVENOUS; SUBCUTANEOUS EVERY 5 MIN PRN
Status: DISCONTINUED | OUTPATIENT
Start: 2023-03-01 | End: 2023-03-01 | Stop reason: HOSPADM

## 2023-03-01 RX ORDER — OXYCODONE HYDROCHLORIDE 5 MG/1
10 TABLET ORAL EVERY 4 HOURS PRN
Status: DISCONTINUED | OUTPATIENT
Start: 2023-03-01 | End: 2023-03-01 | Stop reason: HOSPADM

## 2023-03-01 RX ORDER — OXYCODONE HYDROCHLORIDE 5 MG/1
5 TABLET ORAL
Status: DISCONTINUED | OUTPATIENT
Start: 2023-03-01 | End: 2023-03-01 | Stop reason: HOSPADM

## 2023-03-01 RX ORDER — ROPIVACAINE HYDROCHLORIDE 7.5 MG/ML
INJECTION, SOLUTION EPIDURAL; PERINEURAL
Status: DISCONTINUED | OUTPATIENT
Start: 2023-03-01 | End: 2023-03-01

## 2023-03-01 RX ORDER — PROPOFOL 10 MG/ML
INJECTION, EMULSION INTRAVENOUS
Status: DISCONTINUED | OUTPATIENT
Start: 2023-03-01 | End: 2023-03-01

## 2023-03-01 RX ORDER — ALBUTEROL SULFATE 90 UG/1
AEROSOL, METERED RESPIRATORY (INHALATION)
Status: DISCONTINUED | OUTPATIENT
Start: 2023-03-01 | End: 2023-03-01

## 2023-03-01 RX ORDER — DIPHENHYDRAMINE HYDROCHLORIDE 50 MG/ML
25 INJECTION INTRAMUSCULAR; INTRAVENOUS EVERY 6 HOURS PRN
Status: DISCONTINUED | OUTPATIENT
Start: 2023-03-01 | End: 2023-03-01 | Stop reason: HOSPADM

## 2023-03-01 RX ORDER — SODIUM CHLORIDE 9 MG/ML
INJECTION, SOLUTION INTRAVENOUS CONTINUOUS
Status: DISCONTINUED | OUTPATIENT
Start: 2023-03-01 | End: 2023-03-01 | Stop reason: HOSPADM

## 2023-03-01 RX ORDER — EPINEPHRINE CONVENIENCE KIT 1 MG/ML(1)
KIT INTRAMUSCULAR; SUBCUTANEOUS
Status: DISCONTINUED | OUTPATIENT
Start: 2023-03-01 | End: 2023-03-01 | Stop reason: HOSPADM

## 2023-03-01 RX ORDER — CEFAZOLIN SODIUM 1 G/3ML
INJECTION, POWDER, FOR SOLUTION INTRAMUSCULAR; INTRAVENOUS
Status: DISCONTINUED | OUTPATIENT
Start: 2023-03-01 | End: 2023-03-01

## 2023-03-01 RX ORDER — CELECOXIB 200 MG/1
200 CAPSULE ORAL 2 TIMES DAILY
Qty: 60 CAPSULE | Refills: 2 | Status: SHIPPED | OUTPATIENT
Start: 2023-03-01 | End: 2023-08-23 | Stop reason: ALTCHOICE

## 2023-03-01 RX ORDER — DEXAMETHASONE SODIUM PHOSPHATE 4 MG/ML
INJECTION, SOLUTION INTRA-ARTICULAR; INTRALESIONAL; INTRAMUSCULAR; INTRAVENOUS; SOFT TISSUE
Status: DISCONTINUED | OUTPATIENT
Start: 2023-03-01 | End: 2023-03-01

## 2023-03-01 RX ORDER — ONDANSETRON 2 MG/ML
INJECTION INTRAMUSCULAR; INTRAVENOUS
Status: DISCONTINUED | OUTPATIENT
Start: 2023-03-01 | End: 2023-03-01

## 2023-03-01 RX ORDER — OXYCODONE AND ACETAMINOPHEN 5; 325 MG/1; MG/1
1 TABLET ORAL EVERY 6 HOURS PRN
Qty: 30 TABLET | Refills: 0 | Status: SHIPPED | OUTPATIENT
Start: 2023-03-01 | End: 2023-08-23 | Stop reason: ALTCHOICE

## 2023-03-01 RX ORDER — KETOROLAC TROMETHAMINE 30 MG/ML
INJECTION, SOLUTION INTRAMUSCULAR; INTRAVENOUS
Status: DISCONTINUED | OUTPATIENT
Start: 2023-03-01 | End: 2023-03-01

## 2023-03-01 RX ORDER — SODIUM CHLORIDE, SODIUM LACTATE, POTASSIUM CHLORIDE, CALCIUM CHLORIDE 600; 310; 30; 20 MG/100ML; MG/100ML; MG/100ML; MG/100ML
125 INJECTION, SOLUTION INTRAVENOUS CONTINUOUS
Status: DISCONTINUED | OUTPATIENT
Start: 2023-03-01 | End: 2023-03-01 | Stop reason: HOSPADM

## 2023-03-01 RX ORDER — OXYCODONE AND ACETAMINOPHEN 5; 325 MG/1; MG/1
1 TABLET ORAL EVERY 4 HOURS PRN
Status: DISCONTINUED | OUTPATIENT
Start: 2023-03-01 | End: 2023-03-01 | Stop reason: HOSPADM

## 2023-03-01 RX ORDER — CELECOXIB 100 MG/1
200 CAPSULE ORAL ONCE
Status: COMPLETED | OUTPATIENT
Start: 2023-03-01 | End: 2023-03-01

## 2023-03-01 RX ORDER — LIDOCAINE HYDROCHLORIDE 20 MG/ML
INJECTION, SOLUTION EPIDURAL; INFILTRATION; INTRACAUDAL; PERINEURAL
Status: DISCONTINUED | OUTPATIENT
Start: 2023-03-01 | End: 2023-03-01

## 2023-03-01 RX ORDER — ONDANSETRON 2 MG/ML
4 INJECTION INTRAMUSCULAR; INTRAVENOUS DAILY PRN
Status: DISCONTINUED | OUTPATIENT
Start: 2023-03-01 | End: 2023-03-01 | Stop reason: HOSPADM

## 2023-03-01 RX ORDER — MUPIROCIN 20 MG/G
OINTMENT TOPICAL
Status: DISCONTINUED | OUTPATIENT
Start: 2023-03-01 | End: 2023-03-01 | Stop reason: HOSPADM

## 2023-03-01 RX ORDER — MEPERIDINE HYDROCHLORIDE 25 MG/ML
25 INJECTION INTRAMUSCULAR; INTRAVENOUS; SUBCUTANEOUS EVERY 10 MIN PRN
Status: DISCONTINUED | OUTPATIENT
Start: 2023-03-01 | End: 2023-03-01 | Stop reason: HOSPADM

## 2023-03-01 RX ORDER — ONDANSETRON 4 MG/1
8 TABLET, ORALLY DISINTEGRATING ORAL EVERY 8 HOURS PRN
Status: DISCONTINUED | OUTPATIENT
Start: 2023-03-01 | End: 2023-03-01 | Stop reason: HOSPADM

## 2023-03-01 RX ADMIN — CELECOXIB 200 MG: 100 CAPSULE ORAL at 09:03

## 2023-03-01 RX ADMIN — LIDOCAINE HYDROCHLORIDE 80 MG: 20 INJECTION, SOLUTION INTRAVENOUS at 07:03

## 2023-03-01 RX ADMIN — ONDANSETRON 4 MG: 2 INJECTION INTRAMUSCULAR; INTRAVENOUS at 07:03

## 2023-03-01 RX ADMIN — DEXAMETHASONE SODIUM PHOSPHATE 8 MG: 4 INJECTION, SOLUTION INTRA-ARTICULAR; INTRALESIONAL; INTRAMUSCULAR; INTRAVENOUS; SOFT TISSUE at 08:03

## 2023-03-01 RX ADMIN — FENTANYL CITRATE 100 MCG: 50 INJECTION INTRAMUSCULAR; INTRAVENOUS at 07:03

## 2023-03-01 RX ADMIN — PROPOFOL 200 MG: 10 INJECTION, EMULSION INTRAVENOUS at 07:03

## 2023-03-01 RX ADMIN — SODIUM CHLORIDE: 9 INJECTION, SOLUTION INTRAVENOUS at 08:03

## 2023-03-01 RX ADMIN — MIDAZOLAM HYDROCHLORIDE 2 MG: 1 INJECTION, SOLUTION INTRAMUSCULAR; INTRAVENOUS at 07:03

## 2023-03-01 RX ADMIN — ALBUTEROL SULFATE 2 PUFF: 90 AEROSOL, METERED RESPIRATORY (INHALATION) at 07:03

## 2023-03-01 RX ADMIN — SODIUM CHLORIDE: 9 INJECTION, SOLUTION INTRAVENOUS at 07:03

## 2023-03-01 RX ADMIN — CEFAZOLIN 2 G: 1 INJECTION, POWDER, FOR SOLUTION INTRAMUSCULAR; INTRAVENOUS; PARENTERAL at 07:03

## 2023-03-01 RX ADMIN — DEXAMETHASONE SODIUM PHOSPHATE 8 MG: 4 INJECTION, SOLUTION INTRA-ARTICULAR; INTRALESIONAL; INTRAMUSCULAR; INTRAVENOUS; SOFT TISSUE at 07:03

## 2023-03-01 RX ADMIN — KETOROLAC TROMETHAMINE 60 MG: 30 INJECTION, SOLUTION INTRAMUSCULAR at 08:03

## 2023-03-01 NOTE — ANESTHESIA POSTPROCEDURE EVALUATION
Anesthesia Post Evaluation    Patient: Robert Sanders    Procedure(s) Performed: Procedure(s) (LRB):  ARTHROSCOPY,KNEE,WITH MENISCUS REPAIR (Left)  ARTHROSCOPY, KNEE, WITH MENISCECTOMY (Left)  ARTHROSCOPY, KNEE, WITH CHONDROPLASTY (Left)    Final Anesthesia Type: general      Patient location during evaluation: PACU  Patient participation: Yes- Able to Participate  Level of consciousness: awake and sedated  Post-procedure vital signs: reviewed and stable  Pain management: adequate  Airway patency: patent    PONV status at discharge: No PONV  Anesthetic complications: no      Cardiovascular status: blood pressure returned to baseline  Respiratory status: unassisted  Hydration status: euvolemic  Follow-up not needed.          Vitals Value Taken Time   /93 03/01/23 1000   Temp 36.7 °C (98 °F) 03/01/23 0945   Pulse 77 03/01/23 1007   Resp 11 03/01/23 0900   SpO2 96 % 03/01/23 1007   Vitals shown include unvalidated device data.      Event Time   Out of Recovery 09:00:00         Pain/Rohit Score: Pain Rating Prior to Med Admin: 8 (3/1/2023  9:19 AM)  Rohit Score: 10 (3/1/2023  9:56 AM)

## 2023-03-01 NOTE — ANESTHESIA PREPROCEDURE EVALUATION
03/01/2023  Robert Sanders is a 47 y.o., male.      Pre-op Assessment    I have reviewed the Patient Summary Reports.     I have reviewed the Nursing Notes. I have reviewed the NPO Status.   I have reviewed the Medications.     Review of Systems  Anesthesia Hx:  No problems with previous Anesthesia    Social:  Non-Smoker, No Alcohol Use    Hematology/Oncology:  Hematology Normal   Oncology Normal     EENT/Dental:EENT/Dental Normal   Cardiovascular:  Cardiovascular Normal     Pulmonary:  Pulmonary Normal    Renal/:  Renal/ Normal     Hepatic/GI:  Hepatic/GI Normal    Musculoskeletal:  Musculoskeletal Normal    Neurological:  Neurology Normal    Endocrine:  Endocrine Normal    Dermatological:  Skin Normal    Psych:   anxiety          Physical Exam  General: Well nourished    Airway:  Mallampati: II / II  Mouth Opening: Normal  TM Distance: > 6 cm  Tongue: Normal  Neck ROM: Normal ROM    Chest/Lungs:  Clear to auscultation, Normal Respiratory Rate    Heart:  Rate: Normal  Rhythm: Regular Rhythm        Anesthesia Plan  Type of Anesthesia, risks & benefits discussed:    Anesthesia Type: Gen Supraglottic Airway  Intra-op Monitoring Plan: Standard ASA Monitors  Post Op Pain Control Plan: multimodal analgesia  Induction:  IV  Informed Consent: Informed consent signed with the Patient and all parties understand the risks and agree with anesthesia plan.  All questions answered.   ASA Score: 2  Day of Surgery Review of History & Physical: H&P Update referred to the surgeon/provider.I have interviewed and examined the patient. I have reviewed the patient's H&P dated: There are no significant changes. H&P completed by Anesthesiologist.    Ready For Surgery From Anesthesia Perspective.     .

## 2023-03-01 NOTE — OP NOTE
[facility] - Orthopedic Periop Services  Operative Note    Surgery Date: 3/1/2023      Surgeon(s) and Role:     * Garcia Quiroz MD - Primary    Assistant: Merly Domingo    Pre-op Diagnosis:   Complex tear of medial meniscus of left knee as current injury, initial encounter [S83.232A]     Post-op Diagnosis:  Post-Op Diagnosis Codes:     * Complex tear of medial meniscus of left knee as current injury, initial encounter [S83.232A]     Procedure:    Left knee medial meniscectomy and chondroplasty of the medial femoral condyle    Anesthesia:  General    EBL:  2cc    Implants: * No implants in log *    Tourniquet time: 0 mins    Complication:   none      INDICATIONS:  The patient is a 47 y.o. year-old who had a history and physical examination consistent with the aforementioned diagnoses.  The risks and benefits were discussed with the patient.  The patient acknowledged an understanding and wished to proceed with operative intervention.  Informed consent was obtained prior to the procedure.  Details of the surgical procedure were explained including incisions, probable rehabilitation course and description of the hardware and graft to be used was given.  The patient understands the likely length of convalescence after surgery and we reasonably explained the risks, benefits and alternatives to surgery.  The reasonable expectations and potential complications were discussed and acknowledged including, but not limited to, infection, bleeding, blood clots, nerve injury, retear, instability and continued pain and stiffness.  It was also explained that there was a chance of failure, which may require further management.  The patient agreed, understood and wished to proceed.      Procedure: The patient was taken to the operating room and placed supine.  Anesthesia was administered and pre-operative antibiotics were given.  A timeout was performed.  Patient was positioned appropriately and prepped and draped in the usual sterile  fashion.  Standard anteromedial and anterolateral portals were established and a diagnostic arthroscopy was performed; systemic examination of the joint revealed the following:     PF  negative compartmentalization or adhesions in the suprapatellar pouch.   Trochlear chondromalacia:none  Patella chondromalacia: none    Medial  Medial femoral chondyle chondromalacia : none  Medial tibial plateau chondromalacia none  Positive for meniscus tear.    Lateral  Lateral femoral condyle chondromalacia: none  Lateral tibial plateau chondromalacia: none  Negative for meniscus tear.    Examination of the intercondylar notch revealed: Normal ACL and PCL    The medial meniscus was probed.  There appeared to be a superiorly based flap of posterior horn medial meniscus which had displaced anteriorly within the medial compartment.  This was probed and felt to represent small portion of the meniscus in the meniscectomy would be warranted in this case.  This meniscal flap was debrided with a combination of a biter and a shaver and taken back to a stable base.  In order to better visualize this a pie crusting technique had to be performed.  Slight area of grade 1 chondral irregularity was seen in the medial femoral condyle was debrided with a motorized shaver as well    This completed the procedure all instruments were removed. Portals were closed with 3-0 nylon.  0.25% bupivacaine was injected along the portal sites and sterile dressings were applied.       Disposition:awakened from anesthesia, extubated and taken to the recovery room in a stable condition, having suffered no apparent untoward event.

## 2023-03-01 NOTE — PLAN OF CARE
0821-Pt rec'd to PACU asleep but easliy aroused, on 7L via simple face mask, resp unlabored, ace wrap with cryotherapy to left knee clean, dry and  intact, no c/o pain @ present, will con't to monitor, safety measures ongoing.      0840-Pt awake and alert, c/o pain on a scale of 5 but refuse pain medication, NADN, vss.    0900-Pt awake and alert, on room air, NADN, ace wrap with cryotherapy remain dry, clean and intact, discharge order to floor via MD orders.    0903-Care released to Mary Ellen FRASER at this time, vss bp145/91, hr 77, SaO2-94% and resp 16.

## 2023-03-01 NOTE — TRANSFER OF CARE
"Anesthesia Transfer of Care Note    Patient: Robert Sanders    Procedure(s) Performed: Procedure(s) (LRB):  ARTHROSCOPY,KNEE,WITH MENISCUS REPAIR (Left)  ARTHROSCOPY, KNEE, WITH MENISCECTOMY (Left)  ARTHROSCOPY, KNEE, WITH CHONDROPLASTY (Left)    Patient location: PACU    Anesthesia Type: general    Transport from OR: Transported from OR on room air with adequate spontaneous ventilation    Post pain: adequate analgesia    Post assessment: no apparent anesthetic complications    Post vital signs: stable    Level of consciousness: responds to stimulation and awake    Nausea/Vomiting: no nausea/vomiting    Complications: none    Transfer of care protocol was followed      Last vitals:   Visit Vitals  BP (!) 148/95 (BP Location: Right arm, Patient Position: Lying)   Pulse 90   Temp 36.6 °C (97.9 °F) (Oral)   Resp 12   Ht 5' 9" (1.753 m)   Wt 72.6 kg (160 lb)   SpO2 98%   BMI 23.63 kg/m²     "

## 2023-03-01 NOTE — ANESTHESIA PROCEDURE NOTES
Peripheral Block    Patient location during procedure: OR   Block not for primary anesthetic.  Reason for block: at surgeon's request and post-op pain management   Post-op Pain Location: acl reconstruction lt knee   Start time: 3/1/2023 12:31 PM  Timeout: 3/1/2023 12:30 PM   End time: 3/1/2023 12:32 PM    Staffing  Authorizing Provider: Omar Barron MD  Performing Provider: Omar Barron MD    Preanesthetic Checklist  Completed: patient identified, IV checked, site marked, risks and benefits discussed, surgical consent, monitors and equipment checked, pre-op evaluation and timeout performed  Peripheral Block  Patient position: supine  Prep: ChloraPrep  Patient monitoring: heart rate, cardiac monitor, continuous pulse ox, continuous capnometry and frequent blood pressure checks  Block type: adductor canal  Laterality: left  Injection technique: single shot  Needle  Needle type: Tuohy   Needle gauge: 20 G  Needle length: 4 in  Needle localization: ultrasound guidance   -ultrasound image captured on disc.  Assessment  Injection assessment: negative aspiration, negative parasthesia and local visualized surrounding nerve  Paresthesia pain: none  Heart rate change: no  Slow fractionated injection: yes  Pain Tolerance: comfortable throughout block and no complaints  Medications:    Medications: ROPIvacaine (NAROPIN) injection 0.75% - Perineural   30 mL - 3/1/2023 12:33:00 PM

## 2023-03-01 NOTE — PT/OT/SLP EVAL
Physical Therapy Evaluation    Patient Name:  Robert Sanders   MRN:  [id number]    Recommendations:     Discharge Recommendations: outpatient PT   Discharge Equipment Recommendations: crutches   Barriers to discharge: None    Assessment:     Robert Sanders is a 47 y.o. male admitted with a medical diagnosis of Complex tear of medial meniscus of left knee.  He presents with the following impairments/functional limitations: decreased ROM, gait instability Patient with good understanding of hep and crutches.    Rehab Prognosis: Good; patient would benefit from acute skilled PT services to address these deficits and reach maximum level of function.    Recent Surgery: Procedure(s) (LRB):  ARTHROSCOPY,KNEE,WITH MENISCUS REPAIR (Left)  ARTHROSCOPY, KNEE, WITH MENISCECTOMY (Left)  ARTHROSCOPY, KNEE, WITH CHONDROPLASTY (Left) Day of Surgery    Plan:     During this hospitalization, patient to be seen 1 x/week to address the identified rehab impairments via gait training, therapeutic activities and progress toward the following goals:    Plan of Care Expires:  03/01/23    Subjective     Chief Complaint: post op knee pain  Patient/Family Comments/goals: plans on dc home today  Pain/Comfort:  Pain Rating 1: 4/10  Location - Side 1: Left  Location 1: knee  Pain Addressed 1: Cessation of Activity    Patients cultural, spiritual, Jainism conflicts given the current situation: no    Living Environment:  Lives with spouse  Prior to admission, patients level of function was independent.  Equipment used at home: none.  DME owned (not currently used): none.  Upon discharge, patient will have assistance from spouse.    Objective:     Communicated with nurse prior to session.  Patient found supine with    upon PT entry to room.    General Precautions: Standard,    Orthopedic Precautions:LLE weight bearing as tolerated   Braces:    Respiratory Status: Room air    Exams:  na    Functional Mobility:  Gait: 'ambulated 20 feet with  crutches wbat      AM-PAC 6 CLICK MOBILITY  Total Score:24       Treatment & Education:  HEP: menisectomy protocol    Patient left supine with call button in reach.    GOALS:   Multidisciplinary Problems       Physical Therapy Goals       Not on file              Multidisciplinary Problems (Resolved)          Problem: Physical Therapy    Goal Priority Disciplines Outcome Goal Variances Interventions   Physical Therapy Goal   (Resolved)     PT, PT/OT Met     Description: Short Term Goals  Independent with HEP  Independent with crutchesx 10 feet FWB/WBAT: left lower extremity    Long term goals  Needed equipment for home.                            History:     History reviewed. No pertinent past medical history.    No past surgical history on file.    Time Tracking:     PT Received On: 03/01/23  PT Start Time: 1010     PT Stop Time: 1030  PT Total Time (min): 20 min     Billable Minutes: Evaluation 20 03/01/2023

## 2023-03-01 NOTE — PLAN OF CARE
NAME:_________________________________    DATE: _________________________________    PHYSICIAN:____________________________                             DR. PERRI JORDAN                  Menisectomy and Debridement           Post-Operative Protocol    *PWB for 10 days     Phase I - Maximum Protection (Week 0 to 1):    0 to 1 week:  Ice and modalities as needed to reduce pain and inflammation  Use crutches for 2 to 5 days to help reduce swelling, the patient may discontinue crutches when able to walk without a limp or pain  Elevate the knee above the heart for the first three to five days  Initiate patella mobility drills  Begin full active/passive knee range of motion exercises  Quadriceps setting focusing on VMO function  Multi-plane open kinetic chain straight leg raising  Gait training  Begin stationary bike as swelling and pain allow    Phase II - Progressive Stretching and Early Strengthening Phase (Weeks 1 to 4):    Weeks 1 to 4:  Maintain program from week 0 to 1  Continue modalities as needed  Initiate lower extremity stretching  Begin treadmill and/or elliptical  as strength and swelling allow, avoid impact activities  Begin bilateral closed kinetic chain strengthening progressing to unilateral as tolerated  Promote normal patellofemoral arthrokinematics  Implement reintegration exercises emphasizing core stability exercises  Begin closed kinetic chain multi-plane hip exercises  Manual lower extremity PNF patterns  Proprioception drills emphasizing neuromuscular control    Phase III - Advanced Strengthening and Proprioception Phase (Weeks 4 to 6):    Weeks 4 to 6:  Modalities as needed  Continue with phase II exercises as indicated  Advance time and intensity on cardiovascular program-no running  Begin functional cord resistance program  Initiate gym strengthening program 3 times per week, including leg press, squats, lunges, knee extensions (30° to 0° progressing to full range as PF  arthtokinematics normalize), hamstring curls, ab/adduction, and calf raises  Begin pool running program          Phase IV - Advanced Strengthening Phase (Weeks 6 to 7):    Weeks 6 to 7:  Implement a full gym-strengthening program  Begin running program    Phase V - Return to Sports Phase (Week 8):    Week 8:  Follow-up examination with the physician  Continue with aggressive lower extremity strengthening, stretching, and cardiovascular training  Implement sport specific multi-directional drills  Initiate plyometric exercises beginning with bilateral progressing to unilateral  Sports test for return to play

## 2023-03-01 NOTE — ANESTHESIA PROCEDURE NOTES
LMA    Date/Time: 3/1/2023 7:39 AM  Performed by: Jairo Casey II, CRNA  Authorized by: Omar Barron MD     Intubation:     Induction:  Intravenous    Intubated:  Postinduction    Mask Ventilation:  Easy mask    Attempts:  1    Attempted By:  CRNA    Difficult Airway Encountered?: No      Complications:  None    Airway Device:  Supraglottic airway/LMA    Airway Device Size:  4.0    Style/Cuff Inflation:  Cuffed (inflated to minimal occlusive pressure)    Secured at:  The lips    Placement Verified By:  Capnometry    Complicating Factors:  None    Findings Post-Intubation:  BS equal bilateral and atraumatic/condition of teeth unchanged

## 2023-03-03 NOTE — DISCHARGE SUMMARY
[facility] - Orthopedic Periop Services  Discharge Note  Short Stay    Procedure(s) (LRB):  ARTHROSCOPY, KNEE, WITH MENISCECTOMY (Left)  ARTHROSCOPY, KNEE, WITH CHONDROPLASTY (Left)      OUTCOME: Patient tolerated treatment/procedure well without complication and is now ready for discharge.    DISPOSITION: Home or Self Care    FINAL DIAGNOSIS:  Complex tear of medial meniscus of left knee    FOLLOWUP: In clinic    DISCHARGE INSTRUCTIONS:    Discharge Procedure Orders   Diet general     Remove dressing in 72 hours   Order Comments: Remove dressing in 3 days.  Place band-aids over portal sites.     Call MD for:  temperature >100.4     Call MD for:  persistent nausea and vomiting     Call MD for:  severe uncontrolled pain     Call MD for:  difficulty breathing, headache or visual disturbances     Call MD for:  redness, tenderness, or signs of infection (pain, swelling, redness, odor or green/yellow discharge around incision site)     Call MD for:  hives     Call MD for:  persistent dizziness or light-headedness     Call MD for:  extreme fatigue     Weight bearing restrictions (specify)   Order Comments: Please refer to op note for therapy plan         Clinical Reference Documents Added to Patient Instructions         Document    KNEE ARTHROSCOPY DISCHARGE INSTRUCTIONS (ENGLISH)            TIME SPENT ON DISCHARGE: 9 minutes

## 2023-03-08 ENCOUNTER — OFFICE VISIT (OUTPATIENT)
Dept: ORTHOPEDICS | Facility: CLINIC | Age: 48
End: 2023-03-08
Payer: COMMERCIAL

## 2023-03-08 DIAGNOSIS — Z98.890 S/P LEFT KNEE ARTHROSCOPY: Primary | ICD-10-CM

## 2023-03-08 PROCEDURE — 1159F MED LIST DOCD IN RCRD: CPT | Mod: ,,, | Performed by: NURSE PRACTITIONER

## 2023-03-08 PROCEDURE — 99213 OFFICE O/P EST LOW 20 MIN: CPT | Mod: PBBFAC | Performed by: NURSE PRACTITIONER

## 2023-03-08 PROCEDURE — 99024 PR POST-OP FOLLOW-UP VISIT: ICD-10-PCS | Mod: ,,, | Performed by: NURSE PRACTITIONER

## 2023-03-08 PROCEDURE — 1159F PR MEDICATION LIST DOCUMENTED IN MEDICAL RECORD: ICD-10-PCS | Mod: ,,, | Performed by: NURSE PRACTITIONER

## 2023-03-08 PROCEDURE — 99024 POSTOP FOLLOW-UP VISIT: CPT | Mod: ,,, | Performed by: NURSE PRACTITIONER

## 2023-03-08 NOTE — PROGRESS NOTES
HISTORY OF PRESENT ILLNESS:       No surgery found No surgery found      Pt is here today for First post-operative followup of his knee arthroscopy.      he is doing well.  Patient is 7 days postop left knee arthroscopy, doing well.  He is still using crutches and currently working off of them.  He was told postop that he would not need physical therapy.  He is working on exercises at home.  Would like to try to do his own therapy at this time.  He is working on quad strengthening.  His incisions look good today.  Sutures removed and Steri-Strips applied.  No signs of infection.    We have reviewed his findings and discussed plan of care and future treatment options, including the physical therapy plan.                                                                                     PHYSICAL EXAMINATION:     Incision sites healed well  No evidence of any erythema, infection or induration  Range of motion 0-100 degrees  Minimal effusion  2+ DP pulse  No swelling, no calf tenderness  - Saeid's sign  Negative medial joint line tendernes  Moderate quad atrophy                                                                                 ASSESSMENT:                                                                                                                                               1. Status post above, doing well.                                                                                                                               PLAN:                                                                                                                                                     1. Continue with PT  2. Emphasized quad function.  3. I have discussed return to activity in detail.  4. he will see us back in 4 weeks.                                      5. All questions were answered and he should contact us if he  has any questions or concerns in the interim.              There are no Patient  Instructions on file for this visit.

## 2023-04-06 ENCOUNTER — OFFICE VISIT (OUTPATIENT)
Dept: ORTHOPEDICS | Facility: CLINIC | Age: 48
End: 2023-04-06
Payer: COMMERCIAL

## 2023-04-06 DIAGNOSIS — Z98.890 S/P LEFT KNEE ARTHROSCOPY: Primary | ICD-10-CM

## 2023-04-06 PROCEDURE — 99024 POSTOP FOLLOW-UP VISIT: CPT | Mod: ,,, | Performed by: ORTHOPAEDIC SURGERY

## 2023-04-06 PROCEDURE — 99212 OFFICE O/P EST SF 10 MIN: CPT | Mod: PBBFAC | Performed by: ORTHOPAEDIC SURGERY

## 2023-04-06 PROCEDURE — 99024 PR POST-OP FOLLOW-UP VISIT: ICD-10-PCS | Mod: ,,, | Performed by: ORTHOPAEDIC SURGERY

## 2023-04-06 NOTE — PROGRESS NOTES
HISTORY OF PRESENT ILLNESS:       Arthroscopy, Knee, With Meniscectomy - Left and Arthroscopy, Knee, With Chondroplasty - Left 3/1/2023      Pt is here today for Second post-operative followup of his knee arthroscopy.      Patient is still using crutches at this time.   Patient states that he is having pain just as he did before surgery.   He has a noticeable pop when he extends and straightens his leg.   He states that he feels unstable when he walks so he is wearing a knee brace to help him feel more comfortable.  He has not been to any formal physical therapy at this time.     We have reviewed his findings and discussed plan of care and future treatment options, including the physical therapy plan.                                                                                     PHYSICAL EXAMINATION:     Incision sites healed well  No evidence of any erythema, infection or induration  Range of motion 0-120 degrees  Minimal effusion  2+ DP pulse  No swelling, no calf tenderness  - Saeid's sign  Negative medial joint line tendernes  Moderate quad atrophy                                                                                 ASSESSMENT:                                                                                                                                               1. Status post above, doing well.                                                                                                                               PLAN:                                                                                                                                                     1. Continue with PT  2. Emphasized quad function.  3. I have discussed return to activity in detail.  4. he will see us back in 4 weeks.                                      5. All questions were answered and he should contact us if he  has any questions or concerns in the interim.              There are no Patient  Instructions on file for this visit.

## 2023-04-10 ENCOUNTER — CLINICAL SUPPORT (OUTPATIENT)
Dept: REHABILITATION | Facility: HOSPITAL | Age: 48
End: 2023-04-10
Payer: COMMERCIAL

## 2023-04-10 DIAGNOSIS — R53.1 WEAKNESS: ICD-10-CM

## 2023-04-10 DIAGNOSIS — Z98.890 S/P LEFT KNEE ARTHROSCOPY: ICD-10-CM

## 2023-04-10 DIAGNOSIS — M25.562 ACUTE PAIN OF LEFT KNEE: Primary | ICD-10-CM

## 2023-04-10 DIAGNOSIS — M25.662 STIFFNESS OF LEFT KNEE: ICD-10-CM

## 2023-04-10 DIAGNOSIS — R26.2 DIFFICULTY WALKING: ICD-10-CM

## 2023-04-10 PROCEDURE — 97110 THERAPEUTIC EXERCISES: CPT

## 2023-04-10 PROCEDURE — 97161 PT EVAL LOW COMPLEX 20 MIN: CPT

## 2023-04-10 PROCEDURE — 97112 NEUROMUSCULAR REEDUCATION: CPT

## 2023-04-12 ENCOUNTER — CLINICAL SUPPORT (OUTPATIENT)
Dept: REHABILITATION | Facility: HOSPITAL | Age: 48
End: 2023-04-12
Payer: COMMERCIAL

## 2023-04-12 DIAGNOSIS — Z98.890 S/P LEFT KNEE ARTHROSCOPY: Primary | ICD-10-CM

## 2023-04-12 PROCEDURE — 97530 THERAPEUTIC ACTIVITIES: CPT | Mod: CQ

## 2023-04-12 PROCEDURE — 97110 THERAPEUTIC EXERCISES: CPT | Mod: CQ

## 2023-04-12 PROCEDURE — 97112 NEUROMUSCULAR REEDUCATION: CPT | Mod: CQ

## 2023-04-12 NOTE — PROGRESS NOTES
[facility]   Physical Therapy Treatment Note     Name: Fela Sanders  Clinic Number: [id number]  Physician: Garcia Quiroz MD  Visit Date: 4/12/2023  Therapy Diagnosis:        Encounter Diagnosis   Name Primary?    S/P left knee arthroscopy       Physician Orders: PT Eval and Treat  Medical Diagnosis from Referral: s/p L knee scope  Evaluation Date: 4/10/2023  Updated Plan of Care Due :   Authorization Period Expiration: 04/05/24  Plan of Care Expiration: 05/10/23  Visit # / Visits authorized: 2/ 20    PTA Visit #: 1/5     Time In: 09:15 am  Time Out: 09:57 am  Total Billable Time: 42 minutes    SUBJECTIVE     Pt reports: no pain today upon arrival, just stiffness in the knee.  He was compliant with home exercise program.    Response to previous treatment: first visit since evaluation   Functional change: ambulating without assistive device at this time    Pain: 0/10  Location: left knee      OBJECTIVE     Objective Measures updated at progress report unless specified.     Treatment     FELA received the treatments listed below:      therapeutic exercises to develop strength, endurance, ROM, and flexibility for 13 minutes including:    Bike 5 minutes   SB with quad sets 10 x 10 second hold   Lunge stretch on stairs 3 x 20 second hold   Prone quad stretch with green strap x 30 second hold     manual therapy techniques:  were applied to the: - for - minutes, including:  -    neuromuscular re-education activities to improve: Coordination, Kinesthetic, Sense, and Proprioception for 19 minutes. The following activities were included:    Cable terminal knee extension LLE 4 plates 2 x 10 with 5 second extension hold   Bilateral Leg press with rock into terminal knee extension 7 plates 3 x 10 3 second hold   Quad sets with SLR [place] 20x   Bridging with abduction x 20 with blue theraband     therapeutic activities to improve functional performance for 10 minutes, including:    TRX sit <>  stand x 20  Lateral step downs 2 x 10 with 4inch step height  Fwd step ups 2 x 10 with 4 inch step height    Patient Education and Home Exercises     Home Exercises Provided and Patient Education Provided     Education provided:   - home exercise program reiteration, form correction    Written Home Exercises Provided: Patient instructed to cont prior HEP. Exercises were reviewed and FELA was able to demonstrate them prior to the end of the session.  FELA demonstrated good  understanding of the education provided. See EMR under Patient Instructions for exercises provided during therapy sessions    ASSESSMENT     Supervisory visit with Jayant iGrard PT  prior to initial PTA visit    Patient arrived today for initial visit after evaluation with reports of stiffness in the left knee and weakness noted as compared to RLE. Kept treatment focus today around emphasizing quad function and increasing Patient's functional mobility with Sit<>stands, fwd, and lateral step ups. Patient demonstrated fatigue at conclusion of treatment. Educated Patient to remain diligent with home exercise program and to ice his knee as well to decrease edema. Will continue to progress as tolerated when Patient returns.       PMH from evaluation:   Fela is a 47 y.o. male referred to outpatient Physical Therapy with a medical diagnosis of s/p L knee meniscectomy w/ chondroplasty. Patient presents with decreased L knee ROM, decreased quad strength,decreased flexibility, decreased endurance, decreased functional capacity.  We will address the above deficits through skilled therapy at this time.     FELA Is progressing towards his goals.   Pt prognosis is Good.     Pt will continue to benefit from skilled outpatient physical therapy to address the deficits listed in the problem list box on initial evaluation, provide pt/family education and to maximize pt's level of independence in the home and community environment.     Pt's spiritual,  "cultural and educational needs considered and pt agreeable to plan of care and goals.     Anticipated Barriers for therapy: None  Goals:  Short Term Goals: 2 weeks   Pt will be independent with HEP for continued progression beyond skilled therapy.  Pt will be able to perform a prone heel to butt stretch within 2" of glute to demonstrate improved quad extensibility.  Pt will be able to amb I without use of bilateral crutches and no deviation or limitation from L LE.     Long Term Goals: 5 weeks   Pt will be able to perform a lateral step down from a 6" step without pain or limitation from L knee.  Pt will be able to perform 10 bodyweight squats without reproduction of pain or mechanical symptoms in L knee for improved ability to perform work related activities.  Pt will be able to lift 95lbs from floor to waist without pain or limitation from L knee.     PLAN     Plan of care Certification: 4/10/2023 to 05/10/23.     Outpatient Physical Therapy 2 times weekly for 4 weeks to include the following interventions: Electrical Stimulation NMES, Manual Therapy, Neuromuscular Re-ed, Patient Education, Therapeutic Activities, and Therapeutic Exercise.     Woody Kaur, PTA   04/12/2023    "

## 2023-04-17 ENCOUNTER — CLINICAL SUPPORT (OUTPATIENT)
Dept: REHABILITATION | Facility: HOSPITAL | Age: 48
End: 2023-04-17
Payer: COMMERCIAL

## 2023-04-17 DIAGNOSIS — Z98.890 S/P LEFT KNEE ARTHROSCOPY: Primary | ICD-10-CM

## 2023-04-17 PROCEDURE — 97110 THERAPEUTIC EXERCISES: CPT | Mod: CQ

## 2023-04-17 PROCEDURE — 97530 THERAPEUTIC ACTIVITIES: CPT | Mod: CQ

## 2023-04-17 PROCEDURE — 97112 NEUROMUSCULAR REEDUCATION: CPT | Mod: CQ

## 2023-04-17 NOTE — PROGRESS NOTES
[facility]   Physical Therapy Treatment Note     Name: Fela Salazar [place]  Clinic Number: [id number]  Physician: Garcia Quiroz MD  Visit Date: 4/17/2023  Therapy Diagnosis:        Encounter Diagnosis   Name Primary?    S/P left knee arthroscopy       Physician Orders: PT Eval and Treat  Medical Diagnosis from Referral: s/p L knee scope  Evaluation Date: 4/10/2023  Updated Plan of Care Due :   Authorization Period Expiration: 04/05/24  Plan of Care Expiration: 05/10/23  Visit # / Visits authorized: 2/ 20    PTA Visit #: 1/5     Time In: 3:15 pm  Time Out: 3:56 pm  Total Billable Time: 41 minutes    SUBJECTIVE     Pt reports: he worked all day and his knee is sore. Most pain when fully extending his knee  He was compliant with home exercise program.    Response to previous treatment: first visit since evaluation   Functional change: ambulating without assistive device at this time    Pain: 0/10  Location: left knee      OBJECTIVE     Objective Measures updated at progress report unless specified.     Treatment     FELA received the treatments listed below:      therapeutic exercises to develop strength, endurance, ROM, and flexibility for 15 minutes including:    Bike 5 minutes   SB with quad sets 10 x 10 second hold   Long Bridges 10x10 seconds  Hamstring Curls 5 plates 30x  Lunge stretch on stairs 3 x 20 second hold   Prone quad stretch with green strap x 30 second hold     manual therapy techniques:  were applied to the: - for - minutes, including:  -    neuromuscular re-education activities to improve: Coordination, Kinesthetic, Sense, and Proprioception for 15 minutes. The following activities were included:    Cable terminal knee extension LLE 4 plates 2 x 10 with 5 second extension hold   Bilateral Leg press with rock into terminal knee extension 8 plates 2x10 3 second hold   Single Leg press 5 plates 20x with rock into TERMINAL KNEE EXTENSION   Quad sets with SLR [place] 20x    Bridging single leg 20x    therapeutic activities to improve functional performance for 10 minutes, including:    TRX stagger stance squat x 30  Lateral step downs 2 x 10 with 6 inch step height  Fwd step ups 2 x 10 with 6 inch step height    Patient Education and Home Exercises     Home Exercises Provided and Patient Education Provided     Education provided:   - home exercise program reiteration, form correction    Written Home Exercises Provided: Patient instructed to cont prior HEP. Exercises were reviewed and FELA was able to demonstrate them prior to the end of the session.  FELA demonstrated good  understanding of the education provided. See EMR under Patient Instructions for exercises provided during therapy sessions    ASSESSMENT     Pt tolerated all therapeutic exercises today with some pain noted. Pt's flexion RANGE OF MOTION is great and no loss of motion there. Extension RANGE OF MOTION is doing great as well just pt avoids TERMINAL KNEE EXTENSION due to pain in medial joint line. Able to passively get full extension but has pain at end range. Focused on TERMINAL KNEE EXTENSION during quad strengthening exercises to help with quad control. Will continue to progress as able. Educated pt to keep up with his HEP.       PMH from evaluation:   Fela is a 47 y.o. male referred to outpatient Physical Therapy with a medical diagnosis of s/p L knee meniscectomy w/ chondroplasty. Patient presents with decreased L knee ROM, decreased quad strength,decreased flexibility, decreased endurance, decreased functional capacity.  We will address the above deficits through skilled therapy at this time.     FELA Is progressing towards his goals.   Pt prognosis is Good.     Pt will continue to benefit from skilled outpatient physical therapy to address the deficits listed in the problem list box on initial evaluation, provide pt/family education and to maximize pt's level of independence in the home and  "community environment.     Pt's spiritual, cultural and educational needs considered and pt agreeable to plan of care and goals.     Anticipated Barriers for therapy: None  Goals:  Short Term Goals: 2 weeks   Pt will be independent with HEP for continued progression beyond skilled therapy.  Pt will be able to perform a prone heel to butt stretch within 2" of glute to demonstrate improved quad extensibility.  Pt will be able to amb I without use of bilateral crutches and no deviation or limitation from L LE.     Long Term Goals: 5 weeks   Pt will be able to perform a lateral step down from a 6" step without pain or limitation from L knee.  Pt will be able to perform 10 bodyweight squats without reproduction of pain or mechanical symptoms in L knee for improved ability to perform work related activities.  Pt will be able to lift 95lbs from floor to waist without pain or limitation from L knee.     PLAN     Plan of care Certification: 4/10/2023 to 05/10/23.     Outpatient Physical Therapy 2 times weekly for 4 weeks to include the following interventions: Electrical Stimulation NMES, Manual Therapy, Neuromuscular Re-ed, Patient Education, Therapeutic Activities, and Therapeutic Exercise.     Angel Fritz, PTA   04/17/2023      "

## 2023-04-19 ENCOUNTER — CLINICAL SUPPORT (OUTPATIENT)
Dept: REHABILITATION | Facility: HOSPITAL | Age: 48
End: 2023-04-19
Payer: COMMERCIAL

## 2023-04-19 DIAGNOSIS — Z98.890 S/P LEFT KNEE ARTHROSCOPY: Primary | ICD-10-CM

## 2023-04-19 DIAGNOSIS — M25.562 ACUTE PAIN OF LEFT KNEE: ICD-10-CM

## 2023-04-19 PROCEDURE — 97530 THERAPEUTIC ACTIVITIES: CPT | Mod: CQ

## 2023-04-19 PROCEDURE — 97110 THERAPEUTIC EXERCISES: CPT | Mod: CQ

## 2023-04-19 PROCEDURE — 97112 NEUROMUSCULAR REEDUCATION: CPT | Mod: CQ

## 2023-04-19 NOTE — PROGRESS NOTES
[facility]   Physical Therapy Treatment Note     Name: Fela Salazar [place]  Clinic Number: [id number]  Physician: Garcia Quiroz MD  Visit Date: 4/19/2023  Therapy Diagnosis:        Encounter Diagnosis   Name Primary?    S/P left knee arthroscopy       Physician Orders: PT Eval and Treat  Medical Diagnosis from Referral: s/p L knee scope  Evaluation Date: 4/10/2023  Updated Plan of Care Due :   Authorization Period Expiration: 04/05/24  Plan of Care Expiration: 05/10/23  Visit # / Visits authorized: 4 / 20    PTA Visit #: 3/5     Time In: 3:15 pm  Time Out: 4:00 pm  Total Billable Time: 45 minutes    SUBJECTIVE     Pt reports: he has been up all day so his knee is sore. No change in pain overall    He was compliant with home exercise program.    Response to previous treatment: first visit since evaluation   Functional change: ambulating without assistive device at this time    Pain: 0/10  Location: left knee      OBJECTIVE     Objective Measures updated at progress report unless specified.     Treatment     FELA received the treatments listed below:      therapeutic exercises to develop strength, endurance, ROM, and flexibility for 15 minutes including:    Bike 5 minutes   SB with quad sets 10 x 10 second hold   Long Bridges 10x10 seconds  Hamstring Curls 5 plates 30x  Lunge stretch on stairs 3 x 20 second hold   Prone quad stretch with green strap x 30 second hold     manual therapy techniques:  were applied to the: - for - minutes, including:  -    neuromuscular re-education activities to improve: Coordination, Kinesthetic, Sense, and Proprioception for 15 minutes. The following activities were included:    Cable terminal knee extension LLE 4 plates 2 x 10 with 5 second extension hold   Bilateral Leg press with rock into terminal knee extension 8 plates 2x10 3 second hold   Single Leg press 5 plates 20x with rock into TERMINAL KNEE EXTENSION   Quad sets with SLR [place] 20x   Prone  TERMINAL KNEE EXTENSION 10x10 seconds    therapeutic activities to improve functional performance for 10 minutes, including:    TRX stagger stance squat x 30  Lateral step downs 2 x 10 with 6 inch step height  Fwd step ups 2 x 10 with 6 inch step height    Patient Education and Home Exercises     Home Exercises Provided and Patient Education Provided     Education provided:   - home exercise program reiteration, form correction    Written Home Exercises Provided: Patient instructed to cont prior HEP. Exercises were reviewed and FELA was able to demonstrate them prior to the end of the session.  FELA demonstrated good  understanding of the education provided. See EMR under Patient Instructions for exercises provided during therapy sessions    ASSESSMENT     Pt doing well and able to tolerate all exercises today with some fatigue noted. Pt avoids end range extension during exercises and ambulating. Able to push to end range with some pain present when cued. Flexion RANGE OF MOTION WFL. Progressed LOWER EXTREMITY strengthening as able today. Educated pt to keep diligently working on HEP. Will continue to progress as able.       PMH from evaluation:   Fela is a 47 y.o. male referred to outpatient Physical Therapy with a medical diagnosis of s/p L knee meniscectomy w/ chondroplasty. Patient presents with decreased L knee ROM, decreased quad strength,decreased flexibility, decreased endurance, decreased functional capacity.  We will address the above deficits through skilled therapy at this time.     FELA Is progressing towards his goals.   Pt prognosis is Good.     Pt will continue to benefit from skilled outpatient physical therapy to address the deficits listed in the problem list box on initial evaluation, provide pt/family education and to maximize pt's level of independence in the home and community environment.     Pt's spiritual, cultural and educational needs considered and pt agreeable to plan of  "care and goals.     Anticipated Barriers for therapy: None  Goals:  Short Term Goals: 2 weeks   Pt will be independent with HEP for continued progression beyond skilled therapy.  Pt will be able to perform a prone heel to butt stretch within 2" of glute to demonstrate improved quad extensibility.  Pt will be able to amb I without use of bilateral crutches and no deviation or limitation from L LE.     Long Term Goals: 5 weeks   Pt will be able to perform a lateral step down from a 6" step without pain or limitation from L knee.  Pt will be able to perform 10 bodyweight squats without reproduction of pain or mechanical symptoms in L knee for improved ability to perform work related activities.  Pt will be able to lift 95lbs from floor to waist without pain or limitation from L knee.     PLAN     Plan of care Certification: 4/10/2023 to 05/10/23.     Outpatient Physical Therapy 2 times weekly for 4 weeks to include the following interventions: Electrical Stimulation NMES, Manual Therapy, Neuromuscular Re-ed, Patient Education, Therapeutic Activities, and Therapeutic Exercise.     Angel Fritz, PTA   04/19/2023        "

## 2023-04-24 ENCOUNTER — CLINICAL SUPPORT (OUTPATIENT)
Dept: REHABILITATION | Facility: HOSPITAL | Age: 48
End: 2023-04-24
Payer: COMMERCIAL

## 2023-04-24 DIAGNOSIS — M25.562 ACUTE PAIN OF LEFT KNEE: ICD-10-CM

## 2023-04-24 DIAGNOSIS — M25.662 STIFFNESS OF LEFT KNEE: ICD-10-CM

## 2023-04-24 DIAGNOSIS — R26.2 DIFFICULTY WALKING: ICD-10-CM

## 2023-04-24 DIAGNOSIS — R53.1 WEAKNESS: ICD-10-CM

## 2023-04-24 DIAGNOSIS — Z98.890 S/P LEFT KNEE ARTHROSCOPY: Primary | ICD-10-CM

## 2023-04-24 PROCEDURE — 97110 THERAPEUTIC EXERCISES: CPT

## 2023-04-24 PROCEDURE — 97112 NEUROMUSCULAR REEDUCATION: CPT

## 2023-04-24 NOTE — PROGRESS NOTES
"[facility]   Physical Therapy Treatment Note     Name: Fela [place]  Clinic Number: [id number]  Physician: Garcia Quiroz MD  Visit Date: 4/24/2023  Therapy Diagnosis:        Encounter Diagnosis   Name Primary?    S/P left knee arthroscopy       Physician Orders: PT Eval and Treat  Medical Diagnosis from Referral: s/p L knee scope  Evaluation Date: 4/10/2023  Updated Plan of Care Due :   Authorization Period Expiration: 04/05/24  Plan of Care Expiration: 05/10/23  Visit # / Visits authorized: 5/ 20    PTA Visit #: 0/5     Time In: 3:18pm  Time Out: 3:42 pm  Total Billable Time: 24 minutes    SUBJECTIVE     Pt reports: states knee feels pretty good on arrival. No pain after work.  States he does have to leave by 3:45 today to relieve his .    He was compliant with home exercise program.    Response to previous treatment: first visit since evaluation   Functional change: ambulating without assistive device at this time    Pain: 0/10  Location: left knee      OBJECTIVE     +2 deg extension with no lag with SLR.  145 deg flexion.  No pressure with overpressure into either flex or ext.     Treatment     FELA received the treatments listed below:      therapeutic exercises to develop strength, endurance, ROM, and flexibility for 9 minutes including:    [place] SLR [id number]  Ball Rolling Flexions [id number]  Hamstring Curls 6 plates 3x10  -    neuromuscular re-education activities to improve: Coordination, Kinesthetic, Sense, and Proprioception for 15 minutes. The following activities were included:    Long Bridging [id number]  Bilateral Leg Press [id number]  SL Leg Press [id number] w/ rock into terminal extension w/ 40lbs  Lateral Step Downs 5x5x6" step    Patient Education and Home Exercises     Home Exercises Provided and Patient Education Provided     Education provided:   - home exercise program reiteration, form correction    Written Home Exercises Provided: Patient instructed " "to cont prior HEP. Exercises were reviewed and FELA was able to demonstrate them prior to the end of the session.  FELA demonstrated good  understanding of the education provided. See EMR under Patient Instructions for exercises provided during therapy sessions    ASSESSMENT     Knee doing well at this point.  Good strength in open chain and steadily improving strength and control through range of motion in closed chain as well.  Ddi well with all exercises performed today.  Going to drop pt down to just 1x per week for two more weeks to ensure that he is good to go and compliant with HEP before discharge.     FELA Is progressing towards his goals.   Pt prognosis is Good.     Pt will continue to benefit from skilled outpatient physical therapy to address the deficits listed in the problem list box on initial evaluation, provide pt/family education and to maximize pt's level of independence in the home and community environment.     Pt's spiritual, cultural and educational needs considered and pt agreeable to plan of care and goals.     Anticipated Barriers for therapy: None  Goals:  Short Term Goals: 2 weeks   Pt will be independent with HEP for continued progression beyond skilled therapy.  Pt will be able to perform a prone heel to butt stretch within 2" of glute to demonstrate improved quad extensibility.  Pt will be able to amb I without use of bilateral crutches and no deviation or limitation from L LE.     Long Term Goals: 5 weeks   Pt will be able to perform a lateral step down from a 6" step without pain or limitation from L knee.  Pt will be able to perform 10 bodyweight squats without reproduction of pain or mechanical symptoms in L knee for improved ability to perform work related activities.  Pt will be able to lift 95lbs from floor to waist without pain or limitation from L knee.     PLAN     Plan of care Certification: 4/10/2023 to 05/10/23.     Outpatient Physical Therapy 2 times weekly for 4 " weeks to include the following interventions: Electrical Stimulation NMES, Manual Therapy, Neuromuscular Re-ed, Patient Education, Therapeutic Activities, and Therapeutic Exercise.     Jayant Girard, PT   04/24/2023

## 2023-05-01 ENCOUNTER — CLINICAL SUPPORT (OUTPATIENT)
Dept: REHABILITATION | Facility: HOSPITAL | Age: 48
End: 2023-05-01
Payer: COMMERCIAL

## 2023-05-01 DIAGNOSIS — R53.1 WEAKNESS: ICD-10-CM

## 2023-05-01 DIAGNOSIS — M25.662 STIFFNESS OF LEFT KNEE: ICD-10-CM

## 2023-05-01 DIAGNOSIS — M25.562 ACUTE PAIN OF LEFT KNEE: Primary | ICD-10-CM

## 2023-05-01 PROCEDURE — 97110 THERAPEUTIC EXERCISES: CPT

## 2023-05-01 PROCEDURE — 97112 NEUROMUSCULAR REEDUCATION: CPT

## 2023-05-01 NOTE — PROGRESS NOTES
"[facility]   Physical Therapy Treatment Note     Name: Fela Salazar [place]  Clinic Number: [id number]  Physician: Garcia Quiroz MD  Visit Date: 5/1/2023  Therapy Diagnosis:        Encounter Diagnosis   Name Primary?    S/P left knee arthroscopy       Physician Orders: PT Eval and Treat  Medical Diagnosis from Referral: s/p L knee scope  Evaluation Date: 4/10/2023  Updated Plan of Care Due :   Authorization Period Expiration: 04/05/24  Plan of Care Expiration: 05/10/23  Visit # / Visits authorized: 6/ 20    PTA Visit #: 0/5     Time In: 3:17pm  Time Out: 3:59m  Total Billable Time: 42 minutes    SUBJECTIVE     Pt reports: overall reports feeling pretty good at this point.  Still getting about 3/10 pain at the end of his day at work.   He was compliant with home exercise program.    Response to previous treatment: first visit since evaluation   Functional change: ambulating without assistive device at this time    Pain: 0/10  Location: left knee      OBJECTIVE     +2 deg extension with no lag with SLR.  145 deg flexion.  No pressure with overpressure into either flex or ext.  Strength grossly 3+/5 at this point.     Treatment     FELA received the treatments listed below:      therapeutic exercises to develop strength, endurance, ROM, and flexibility for 19 minutes including:  Upright Bike x 6 min  [place]eter SLR [id number]  Ball Rolling Flexions [id number]  Hamstring Curls 6 plates 3x10  Prone Quad Sets [id number]  Prone Quad Stretch [id number]    neuromuscular re-education activities to improve: Coordination, Kinesthetic, Sense, and Proprioception for 23 minutes. The following activities were included:    Long Bridging [id number]  Bilateral Leg Press [id number]  SL Leg Press [id number] w/ rock into terminal extension w/ 40lbs  Lateral Step Downs 5x5x6" step  Step Ups 4x5 to 18" step      Patient Education and Home Exercises     Home Exercises Provided and Patient Education Provided     Education " "provided:   - home exercise program reiteration, form correction    Written Home Exercises Provided: Patient instructed to cont prior HEP. Exercises were reviewed and FELA was able to demonstrate them prior to the end of the session.  FELA demonstrated good  understanding of the education provided. See EMR under Patient Instructions for exercises provided during therapy sessions    ASSESSMENT     Continues to do well at this point still just experiencing some pain at end of the day due to prolonged squatting/kneeling.  Do think this should resolve as the quad continues to get stronger.  Did well with all exercises performed today.  Will continue with current plan at 1x per week.     FELA Is progressing towards his goals.   Pt prognosis is Good.     Pt will continue to benefit from skilled outpatient physical therapy to address the deficits listed in the problem list box on initial evaluation, provide pt/family education and to maximize pt's level of independence in the home and community environment.     Pt's spiritual, cultural and educational needs considered and pt agreeable to plan of care and goals.     Anticipated Barriers for therapy: None  Goals:  Short Term Goals: 2 weeks   Pt will be independent with HEP for continued progression beyond skilled therapy.  Pt will be able to perform a prone heel to butt stretch within 2" of glute to demonstrate improved quad extensibility.  Pt will be able to amb I without use of bilateral crutches and no deviation or limitation from L LE.     Long Term Goals: 5 weeks   Pt will be able to perform a lateral step down from a 6" step without pain or limitation from L knee.  Pt will be able to perform 10 bodyweight squats without reproduction of pain or mechanical symptoms in L knee for improved ability to perform work related activities.  Pt will be able to lift 95lbs from floor to waist without pain or limitation from L knee.     PLAN     Plan of care Certification: " 4/10/2023 to 05/10/23.     Outpatient Physical Therapy 2 times weekly for 4 weeks to include the following interventions: Electrical Stimulation NMES, Manual Therapy, Neuromuscular Re-ed, Patient Education, Therapeutic Activities, and Therapeutic Exercise.     Jayant Girard, PT   05/01/2023

## 2023-05-08 ENCOUNTER — CLINICAL SUPPORT (OUTPATIENT)
Dept: REHABILITATION | Facility: HOSPITAL | Age: 48
End: 2023-05-08
Payer: COMMERCIAL

## 2023-05-08 DIAGNOSIS — M25.562 ACUTE PAIN OF LEFT KNEE: Primary | ICD-10-CM

## 2023-05-08 DIAGNOSIS — M25.662 STIFFNESS OF LEFT KNEE: ICD-10-CM

## 2023-05-08 PROCEDURE — 97110 THERAPEUTIC EXERCISES: CPT | Mod: CQ

## 2023-05-08 PROCEDURE — 97112 NEUROMUSCULAR REEDUCATION: CPT | Mod: CQ

## 2023-05-08 NOTE — PROGRESS NOTES
"[facility]   Physical Therapy Treatment Note     Name: Fela [place]  Clinic Number: [id number]  Physician: Garcia Quiroz MD  Visit Date: 5/8/2023  Therapy Diagnosis:        Encounter Diagnosis   Name Primary?    S/P left knee arthroscopy       Physician Orders: PT Eval and Treat  Medical Diagnosis from Referral: s/p L knee scope  Evaluation Date: 4/10/2023  Updated Plan of Care Due :   Authorization Period Expiration: 04/05/24  Plan of Care Expiration: 05/10/23  Visit # / Visits authorized: 7 / 20    PTA Visit #: 1/5     Time In: 3:15 pm  Time Out: 3:53 pm  Total Billable Time: 38 minutes    SUBJECTIVE     Pt reports: 1/10 pain today; overall doing better and strength/pain are improving  He was compliant with home exercise program.    Response to previous treatment: first visit since evaluation   Functional change: ambulating without assistive device at this time    Pain: 0/10  Location: left knee      OBJECTIVE     +2 deg extension with no lag with SLR.  145 deg flexion.  No pressure with overpressure into either flex or ext.  Strength grossly 3+/5 at this point.     Treatment     FELA received the treatments listed below:      therapeutic exercises to develop strength, endurance, ROM, and flexibility for 16 minutes including:  Upright Bike x 5 min  [place] SLR [id number]  Ball Rolling Flexions [id number]  Hamstring Curls 6 plates 3x10  Prone Quad Sets [id number]  Prone Quad Stretch [id number]  Planks 10x10 seconds    neuromuscular re-education activities to improve: Coordination, Kinesthetic, Sense, and Proprioception for 22 minutes. The following activities were included:    Long Bridging [id number]  Bilateral Leg Press [id number]  SL Leg Press [id number] w/ rock into terminal extension w/ 40lbs  Lateral Step Downs 5x5x6" step  Step Ups 4x5 to 18" step      Patient Education and Home Exercises     Home Exercises Provided and Patient Education Provided     Education provided:   - home " "exercise program reiteration, form correction    Written Home Exercises Provided: Patient instructed to cont prior HEP. Exercises were reviewed and FELA was able to demonstrate them prior to the end of the session.  FELA demonstrated good  understanding of the education provided. See EMR under Patient Instructions for exercises provided during therapy sessions    ASSESSMENT     Pt is doing well and continues to make improvement in strength/RANGE OF MOTION. RANGE OF MOTION is WFL. Pt is doing very well with no pain during session only muscular fatigue. Pt is MD appt tomorrow and could possibly be released then. Will progress as able unless released by MD.    FELA Is progressing towards his goals.   Pt prognosis is Good.     Pt will continue to benefit from skilled outpatient physical therapy to address the deficits listed in the problem list box on initial evaluation, provide pt/family education and to maximize pt's level of independence in the home and community environment.     Pt's spiritual, cultural and educational needs considered and pt agreeable to plan of care and goals.     Anticipated Barriers for therapy: None  Goals:  Short Term Goals: 2 weeks   Pt will be independent with HEP for continued progression beyond skilled therapy.  Pt will be able to perform a prone heel to butt stretch within 2" of glute to demonstrate improved quad extensibility.  Pt will be able to amb I without use of bilateral crutches and no deviation or limitation from L LE.     Long Term Goals: 5 weeks   Pt will be able to perform a lateral step down from a 6" step without pain or limitation from L knee.  Pt will be able to perform 10 bodyweight squats without reproduction of pain or mechanical symptoms in L knee for improved ability to perform work related activities.  Pt will be able to lift 95lbs from floor to waist without pain or limitation from L knee.     PLAN     Plan of care Certification: 4/10/2023 to 05/10/23.   "   Outpatient Physical Therapy 2 times weekly for 4 weeks to include the following interventions: Electrical Stimulation NMES, Manual Therapy, Neuromuscular Re-ed, Patient Education, Therapeutic Activities, and Therapeutic Exercise.     Angel Fritz, PTA   05/08/2023

## 2023-05-09 ENCOUNTER — OFFICE VISIT (OUTPATIENT)
Dept: ORTHOPEDICS | Facility: CLINIC | Age: 48
End: 2023-05-09
Payer: COMMERCIAL

## 2023-05-09 DIAGNOSIS — Z98.890 S/P LEFT KNEE ARTHROSCOPY: Primary | ICD-10-CM

## 2023-05-09 PROCEDURE — 1159F MED LIST DOCD IN RCRD: CPT | Mod: ,,, | Performed by: ORTHOPAEDIC SURGERY

## 2023-05-09 PROCEDURE — 1160F PR REVIEW ALL MEDS BY PRESCRIBER/CLIN PHARMACIST DOCUMENTED: ICD-10-PCS | Mod: ,,, | Performed by: ORTHOPAEDIC SURGERY

## 2023-05-09 PROCEDURE — 99024 POSTOP FOLLOW-UP VISIT: CPT | Mod: ,,, | Performed by: ORTHOPAEDIC SURGERY

## 2023-05-09 PROCEDURE — 1160F RVW MEDS BY RX/DR IN RCRD: CPT | Mod: ,,, | Performed by: ORTHOPAEDIC SURGERY

## 2023-05-09 PROCEDURE — 99213 OFFICE O/P EST LOW 20 MIN: CPT | Mod: PBBFAC | Performed by: ORTHOPAEDIC SURGERY

## 2023-05-09 PROCEDURE — 99024 PR POST-OP FOLLOW-UP VISIT: ICD-10-PCS | Mod: ,,, | Performed by: ORTHOPAEDIC SURGERY

## 2023-05-09 PROCEDURE — 1159F PR MEDICATION LIST DOCUMENTED IN MEDICAL RECORD: ICD-10-PCS | Mod: ,,, | Performed by: ORTHOPAEDIC SURGERY

## 2023-08-23 ENCOUNTER — OFFICE VISIT (OUTPATIENT)
Dept: FAMILY MEDICINE | Facility: CLINIC | Age: 48
End: 2023-08-23
Payer: COMMERCIAL

## 2023-08-23 VITALS
SYSTOLIC BLOOD PRESSURE: 126 MMHG | TEMPERATURE: 98 F | OXYGEN SATURATION: 98 % | HEART RATE: 74 BPM | WEIGHT: 154.31 LBS | BODY MASS INDEX: 22.85 KG/M2 | DIASTOLIC BLOOD PRESSURE: 74 MMHG | HEIGHT: 69 IN

## 2023-08-23 DIAGNOSIS — M54.50 BILATERAL LOW BACK PAIN, UNSPECIFIED CHRONICITY, UNSPECIFIED WHETHER SCIATICA PRESENT: Primary | ICD-10-CM

## 2023-08-23 PROCEDURE — 99213 OFFICE O/P EST LOW 20 MIN: CPT | Mod: 25,ICN,, | Performed by: STUDENT IN AN ORGANIZED HEALTH CARE EDUCATION/TRAINING PROGRAM

## 2023-08-23 PROCEDURE — 1159F MED LIST DOCD IN RCRD: CPT | Mod: ICN,,, | Performed by: STUDENT IN AN ORGANIZED HEALTH CARE EDUCATION/TRAINING PROGRAM

## 2023-08-23 PROCEDURE — 99051 MED SERV EVE/WKEND/HOLIDAY: CPT | Mod: ICN,,, | Performed by: STUDENT IN AN ORGANIZED HEALTH CARE EDUCATION/TRAINING PROGRAM

## 2023-08-23 PROCEDURE — 3008F BODY MASS INDEX DOCD: CPT | Mod: ICN,,, | Performed by: STUDENT IN AN ORGANIZED HEALTH CARE EDUCATION/TRAINING PROGRAM

## 2023-08-23 PROCEDURE — 3078F DIAST BP <80 MM HG: CPT | Mod: ICN,,, | Performed by: STUDENT IN AN ORGANIZED HEALTH CARE EDUCATION/TRAINING PROGRAM

## 2023-08-23 PROCEDURE — 3074F SYST BP LT 130 MM HG: CPT | Mod: ICN,,, | Performed by: STUDENT IN AN ORGANIZED HEALTH CARE EDUCATION/TRAINING PROGRAM

## 2023-08-23 PROCEDURE — 96372 THER/PROPH/DIAG INJ SC/IM: CPT | Mod: ICN,,, | Performed by: STUDENT IN AN ORGANIZED HEALTH CARE EDUCATION/TRAINING PROGRAM

## 2023-08-23 PROCEDURE — 3074F PR MOST RECENT SYSTOLIC BLOOD PRESSURE < 130 MM HG: ICD-10-PCS | Mod: ICN,,, | Performed by: STUDENT IN AN ORGANIZED HEALTH CARE EDUCATION/TRAINING PROGRAM

## 2023-08-23 PROCEDURE — 3078F PR MOST RECENT DIASTOLIC BLOOD PRESSURE < 80 MM HG: ICD-10-PCS | Mod: ICN,,, | Performed by: STUDENT IN AN ORGANIZED HEALTH CARE EDUCATION/TRAINING PROGRAM

## 2023-08-23 PROCEDURE — 3008F PR BODY MASS INDEX (BMI) DOCUMENTED: ICD-10-PCS | Mod: ICN,,, | Performed by: STUDENT IN AN ORGANIZED HEALTH CARE EDUCATION/TRAINING PROGRAM

## 2023-08-23 PROCEDURE — 1159F PR MEDICATION LIST DOCUMENTED IN MEDICAL RECORD: ICD-10-PCS | Mod: ICN,,, | Performed by: STUDENT IN AN ORGANIZED HEALTH CARE EDUCATION/TRAINING PROGRAM

## 2023-08-23 PROCEDURE — 99213 PR OFFICE/OUTPT VISIT, EST, LEVL III, 20-29 MIN: ICD-10-PCS | Mod: 25,ICN,, | Performed by: STUDENT IN AN ORGANIZED HEALTH CARE EDUCATION/TRAINING PROGRAM

## 2023-08-23 PROCEDURE — 99051 PR MEDICAL SERVICES, EVE/WKEND/HOLIDAY: ICD-10-PCS | Mod: ICN,,, | Performed by: STUDENT IN AN ORGANIZED HEALTH CARE EDUCATION/TRAINING PROGRAM

## 2023-08-23 PROCEDURE — 96372 PR INJECTION,THERAP/PROPH/DIAG2ST, IM OR SUBCUT: ICD-10-PCS | Mod: ICN,,, | Performed by: STUDENT IN AN ORGANIZED HEALTH CARE EDUCATION/TRAINING PROGRAM

## 2023-08-23 RX ORDER — KETOROLAC TROMETHAMINE 30 MG/ML
60 INJECTION, SOLUTION INTRAMUSCULAR; INTRAVENOUS
Status: COMPLETED | OUTPATIENT
Start: 2023-08-23 | End: 2023-08-23

## 2023-08-23 RX ORDER — METHYLPREDNISOLONE ACETATE 40 MG/ML
40 INJECTION, SUSPENSION INTRA-ARTICULAR; INTRALESIONAL; INTRAMUSCULAR; SOFT TISSUE ONCE
Status: COMPLETED | OUTPATIENT
Start: 2023-08-23 | End: 2023-08-23

## 2023-08-23 RX ORDER — DEXAMETHASONE SODIUM PHOSPHATE 4 MG/ML
4 INJECTION, SOLUTION INTRA-ARTICULAR; INTRALESIONAL; INTRAMUSCULAR; INTRAVENOUS; SOFT TISSUE
Status: COMPLETED | OUTPATIENT
Start: 2023-08-23 | End: 2023-08-23

## 2023-08-23 RX ADMIN — METHYLPREDNISOLONE ACETATE 40 MG: 40 INJECTION, SUSPENSION INTRA-ARTICULAR; INTRALESIONAL; INTRAMUSCULAR; SOFT TISSUE at 04:08

## 2023-08-23 RX ADMIN — KETOROLAC TROMETHAMINE 60 MG: 30 INJECTION, SOLUTION INTRAMUSCULAR; INTRAVENOUS at 04:08

## 2023-08-23 RX ADMIN — DEXAMETHASONE SODIUM PHOSPHATE 4 MG: 4 INJECTION, SOLUTION INTRA-ARTICULAR; INTRALESIONAL; INTRAMUSCULAR; INTRAVENOUS; SOFT TISSUE at 04:08

## 2023-08-23 NOTE — PATIENT INSTRUCTIONS
"Staying active - The best thing you can do is to stay as active as possible. People with low back pain recover faster if they stay active. If your pain is severe, you might need to rest for a day or 2. But it's important to get back to walking and moving as soon as possible. While you should avoid heavy lifting and sports while your back hurts, try to keep doing your normal daily activities.  Heat - Some people find that it helps to use a heating pad or heated wrap. Be careful to avoid high heat settings to prevent skin burns.  Medicines - First, you can try pain medicines that you can get without a prescription. In many cases, doctors suggest first trying a nonsteroidal antiinflammatory drug, or "NSAID." NSAIDs include ibuprofen (sample brand names: Advil, Motrin) and naproxen (sample brand name: Aleve). These might work better than acetaminophen (Tylenol) for back pain.  If non-prescription medicines do not help, let your doctor or nurse know. In some cases, doctors prescribe a medicine to relax the muscles (called a "muscle relaxant"). But keep in mind that muscle relaxants are not generally used in people older than 65. In older people, these medicines can cause side effects such as trouble urinating or confusion.  Treatments to help with symptoms - Some treatments might help you feel better for a little while. They include:  Spinal manipulation - This is when a chiropractor, physical therapist, or other professional moves or "adjusts" the joints of your back. If you want to try this, talk to your doctor or nurse first.  Acupuncture - This is when someone who knows traditional Chinese medicine inserts tiny needles into your body to block pain signals.  Massage  While back pain usually goes away within a few weeks, some people do continue to have pain for longer. In this case, additional treatments might include:  Self care - This involves being aware of your pain. While you should rest when you need to, it's " "important to stay active as much as you can. Things like applying heat and doing gentle stretches can help you feel better, too.  Physical therapy - A physical therapist is an exercise expert who can teach you stretches and movements to help strengthen your muscles. The goal is to relieve pain but also help you get back to your normal activities.   Exercises you can try include walking, swimming, or using an exercise bike. Some people also find that Antonio Chi or yoga can help with their back pain. Finding activities you enjoy can help you stay active.  Reducing stress - Some people find that it helps to try something called "mindfulness-based stress reduction." This involves going to a group program to practice relaxation and meditation. If your back pain is making you feel anxious or depressed, talk to your doctor or nurse. There are other treatments that can help with these problems.  Some people wonder if injections (shots) can help to relieve back pain. In some cases, doctors might recommend a shot of medicine to numb the area or reduce swelling. But this has only been proven to work in specific situations.  Only a small number of people end up needing surgery to treat back pain.  "

## 2023-08-24 NOTE — PROGRESS NOTES
[facility] Medicine    Chief Complaint      Chief Complaint   Patient presents with    Injections     Pt was recently seen at [facility] & was sent over here for steroid injection related to his [place] region. Pt had back surgery 3yrs ago. L4 & L5 was herniated        History of Present Illness      Robert Sanders is a 47 y.o. male with chronic conditions of chronic back pain who presents today for steroid and toradol injections as recommended by his chiropractor.  History of back surgery 3 years ago, current flare up began on Monday, worsened by movement, certain position changes.  Does not want to try any medications, wants to get injections as recommended and follow up with chiropractor.          Past Medical History:  Past Medical History:   Diagnosis Date    Allergy        Past Surgical History:   has a past surgical history that includes Knee arthroscopy w/ meniscectomy (Left, 3/1/2023) and Arthroscopic chondroplasty of knee joint (Left, 3/1/2023).    Social History:  Social History     Tobacco Use    Smoking status: Every Day     Current packs/day: 0.50     Types: Cigarettes, Vaping with nicotine    Smokeless tobacco: Never    Tobacco comments:     Cigarettes are down to 0.25 packs/day   Substance Use Topics    Alcohol use: Yes    Drug use: Never       I personally reviewed all past medical, surgical, and social.     Review of Systems   Constitutional:  Negative for activity change, chills, fatigue and fever.   HENT:  Negative for nasal congestion, ear discharge, ear pain, postnasal drip, sinus pressure/congestion and sore throat.    Respiratory:  Negative for chest tightness, shortness of breath and wheezing.    Cardiovascular:  Negative for chest pain and palpitations.   Gastrointestinal:  Negative for abdominal pain, constipation, nausea and vomiting.   Musculoskeletal:  Positive for back pain.   Integumentary:  Negative for rash.   Neurological:  Negative for dizziness and headaches.  "  All other systems reviewed and are negative.       Medications:  Outpatient Encounter Medications as of 8/23/2023   Medication Sig Dispense Refill    clonazePAM (KLONOPIN) 1 MG tablet Take 1 tablet (1 mg total) by mouth daily as needed for Anxiety. 30 tablet 1    omeprazole (PRILOSEC) 20 MG capsule Take 20 mg by mouth once daily.      celecoxib (CELEBREX) 200 MG capsule Take 1 capsule (200 mg total) by mouth 2 (two) times daily. (Patient not taking: Reported on 8/23/2023) 60 capsule 2    ondansetron (ZOFRAN-ODT) 4 MG TbDL Take 1 tablet (4 mg total) by mouth every 6 (six) hours as needed (nausea). (Patient not taking: Reported on 8/23/2023) 30 tablet 0    oxyCODONE-acetaminophen (PERCOCET) 5-325 mg per tablet Take 1 tablet by mouth every 6 (six) hours as needed for Pain. (Patient not taking: Reported on 8/23/2023) 30 tablet 0     Facility-Administered Encounter Medications as of 8/23/2023   Medication Dose Route Frequency Provider Last Rate Last Admin    [COMPLETED] dexAMETHasone injection 4 mg  4 mg Intramuscular 1 time in Clinic/HOD Diane Almeida, FNP   4 mg at 08/23/23 1645    [COMPLETED] ketorolac injection 60 mg  60 mg Intramuscular 1 time in Clinic/HOD Diane Almeida, FNP   60 mg at 08/23/23 1646    [COMPLETED] methylPREDNISolone acetate injection 40 mg  40 mg Intramuscular Once Diane Almeida, FNP   40 mg at 08/23/23 1645       Allergies:  Review of patient's allergies indicates:  No Known Allergies    Health Maintenance:    There is no immunization history on file for this patient.   Health Maintenance   Topic Date Due    Hepatitis C Screening  Never done    Lipid Panel  Never done    TETANUS VACCINE  Never done    Colorectal Cancer Screening  Never done        Physical Exam      Vital Signs  Temp: 98.3 °F (36.8 °C)  Temp Source: Oral  Pulse: 74  SpO2: 98 %  BP: 126/74  BP Location: Right arm  Patient Position: Sitting  Height and Weight  Height: 5' 9" (175.3 cm)  Weight: 70 kg (154 lb 4.8 " oz)  BSA (Calculated - sq m): 1.85 sq meters  BMI (Calculated): 22.8  Weight in (lb) to have BMI = 25: 168.9]    Physical Exam  Vitals and nursing note reviewed.   Constitutional:       Appearance: Normal appearance.   HENT:      Head: Normocephalic and atraumatic.   Cardiovascular:      Rate and Rhythm: Normal rate and regular rhythm.      Heart sounds: Normal heart sounds.   Pulmonary:      Effort: Pulmonary effort is normal.      Breath sounds: Normal breath sounds.   Musculoskeletal:         General: Tenderness present.   Skin:     Findings: No rash.   Neurological:      General: No focal deficit present.      Mental Status: He is alert and oriented to person, place, and time.      Gait: Gait normal.   Psychiatric:         Mood and Affect: Mood normal.         Behavior: Behavior normal.         Thought Content: Thought content normal.         Judgment: Judgment normal.          Laboratory:  CBC:      CMP:  Recent Labs   Lab 02/24/23  1510   Glucose 99   Calcium 9.2   Sodium 138   Potassium 3.8   CO2 28   Chloride 105   BUN 9     LIPIDS:      TSH:      A1C:        Assessment/Plan     Robert Sanders is a 47 y.o.male with:    1. Bilateral low back pain, unspecified chronicity, unspecified whether sciatica present  -     dexAMETHasone injection 4 mg  -     methylPREDNISolone acetate injection 40 mg  -     ketorolac injection 60 mg         Chronic conditions status updated as per HPI.  Other than changes above, cont current medications and maintain follow up with specialists.  Return to clinic as needed.     Patient Instructions   Staying active - The best thing you can do is to stay as active as possible. People with low back pain recover faster if they stay active. If your pain is severe, you might need to rest for a day or 2. But it's important to get back to walking and moving as soon as possible. While you should avoid heavy lifting and sports while your back hurts, try to keep doing your normal daily  "activities.  Heat - Some people find that it helps to use a heating pad or heated wrap. Be careful to avoid high heat settings to prevent skin burns.  Medicines - First, you can try pain medicines that you can get without a prescription. In many cases, doctors suggest first trying a nonsteroidal antiinflammatory drug, or "NSAID." NSAIDs include ibuprofen (sample brand names: Advil, Motrin) and naproxen (sample brand name: Aleve). These might work better than acetaminophen (Tylenol) for back pain.  If non-prescription medicines do not help, let your doctor or nurse know. In some cases, doctors prescribe a medicine to relax the muscles (called a "muscle relaxant"). But keep in mind that muscle relaxants are not generally used in people older than 65. In older people, these medicines can cause side effects such as trouble urinating or confusion.  Treatments to help with symptoms - Some treatments might help you feel better for a little while. They include:  Spinal manipulation - This is when a chiropractor, physical therapist, or other professional moves or "adjusts" the joints of your back. If you want to try this, talk to your doctor or nurse first.  Acupuncture - This is when someone who knows traditional Chinese medicine inserts tiny needles into your body to block pain signals.  Massage  While back pain usually goes away within a few weeks, some people do continue to have pain for longer. In this case, additional treatments might include:  Self care - This involves being aware of your pain. While you should rest when you need to, it's important to stay active as much as you can. Things like applying heat and doing gentle stretches can help you feel better, too.  Physical therapy - A physical therapist is an exercise expert who can teach you stretches and movements to help strengthen your muscles. The goal is to relieve pain but also help you get back to your normal activities.   Exercises you can try include " "walking, swimming, or using an exercise bike. Some people also find that Antonio Chi or yoga can help with their back pain. Finding activities you enjoy can help you stay active.  Reducing stress - Some people find that it helps to try something called "mindfulness-based stress reduction." This involves going to a group program to practice relaxation and meditation. If your back pain is making you feel anxious or depressed, talk to your doctor or nurse. There are other treatments that can help with these problems.  Some people wonder if injections (shots) can help to relieve back pain. In some cases, doctors might recommend a shot of medicine to numb the area or reduce swelling. But this has only been proven to work in specific situations.  Only a small number of people end up needing surgery to treat back pain.     Diane Almeida, [facility]                  "

## 2024-01-21 ENCOUNTER — OFFICE VISIT (OUTPATIENT)
Dept: FAMILY MEDICINE | Facility: CLINIC | Age: 49
End: 2024-01-21
Payer: COMMERCIAL

## 2024-01-21 VITALS
DIASTOLIC BLOOD PRESSURE: 91 MMHG | BODY MASS INDEX: 23.89 KG/M2 | TEMPERATURE: 100 F | HEART RATE: 116 BPM | HEIGHT: 69 IN | OXYGEN SATURATION: 96 % | SYSTOLIC BLOOD PRESSURE: 155 MMHG | WEIGHT: 161.31 LBS

## 2024-01-21 DIAGNOSIS — J10.1 INFLUENZA A: Primary | ICD-10-CM

## 2024-01-21 DIAGNOSIS — R52 GENERALIZED BODY ACHES: ICD-10-CM

## 2024-01-21 LAB
CTP QC/QA: YES
FLUAV AG NPH QL: POSITIVE
FLUBV AG NPH QL: NEGATIVE
S PYO RRNA THROAT QL PROBE: NEGATIVE
SARS-COV-2 RDRP RESP QL NAA+PROBE: NEGATIVE

## 2024-01-21 PROCEDURE — 3008F BODY MASS INDEX DOCD: CPT | Mod: ,,, | Performed by: STUDENT IN AN ORGANIZED HEALTH CARE EDUCATION/TRAINING PROGRAM

## 2024-01-21 PROCEDURE — 3080F DIAST BP >= 90 MM HG: CPT | Mod: ,,, | Performed by: STUDENT IN AN ORGANIZED HEALTH CARE EDUCATION/TRAINING PROGRAM

## 2024-01-21 PROCEDURE — 96372 THER/PROPH/DIAG INJ SC/IM: CPT | Mod: ,,, | Performed by: STUDENT IN AN ORGANIZED HEALTH CARE EDUCATION/TRAINING PROGRAM

## 2024-01-21 PROCEDURE — 87635 SARS-COV-2 COVID-19 AMP PRB: CPT | Mod: QW,,, | Performed by: STUDENT IN AN ORGANIZED HEALTH CARE EDUCATION/TRAINING PROGRAM

## 2024-01-21 PROCEDURE — 99051 MED SERV EVE/WKEND/HOLIDAY: CPT | Mod: ,,, | Performed by: STUDENT IN AN ORGANIZED HEALTH CARE EDUCATION/TRAINING PROGRAM

## 2024-01-21 PROCEDURE — 87880 STREP A ASSAY W/OPTIC: CPT | Mod: QW,,, | Performed by: STUDENT IN AN ORGANIZED HEALTH CARE EDUCATION/TRAINING PROGRAM

## 2024-01-21 PROCEDURE — 87804 INFLUENZA ASSAY W/OPTIC: CPT | Mod: 59,QW,, | Performed by: STUDENT IN AN ORGANIZED HEALTH CARE EDUCATION/TRAINING PROGRAM

## 2024-01-21 PROCEDURE — 99214 OFFICE O/P EST MOD 30 MIN: CPT | Mod: 25,,, | Performed by: STUDENT IN AN ORGANIZED HEALTH CARE EDUCATION/TRAINING PROGRAM

## 2024-01-21 PROCEDURE — 1159F MED LIST DOCD IN RCRD: CPT | Mod: ,,, | Performed by: STUDENT IN AN ORGANIZED HEALTH CARE EDUCATION/TRAINING PROGRAM

## 2024-01-21 PROCEDURE — 3077F SYST BP >= 140 MM HG: CPT | Mod: ,,, | Performed by: STUDENT IN AN ORGANIZED HEALTH CARE EDUCATION/TRAINING PROGRAM

## 2024-01-21 RX ORDER — IBUPROFEN 800 MG/1
800 TABLET ORAL EVERY 8 HOURS PRN
Qty: 30 TABLET | Refills: 0 | Status: SHIPPED | OUTPATIENT
Start: 2024-01-21

## 2024-01-21 RX ORDER — OSELTAMIVIR PHOSPHATE 75 MG/1
75 CAPSULE ORAL 2 TIMES DAILY
Qty: 10 CAPSULE | Refills: 0 | Status: SHIPPED | OUTPATIENT
Start: 2024-01-21 | End: 2024-01-26

## 2024-01-21 RX ORDER — DEXAMETHASONE SODIUM PHOSPHATE 4 MG/ML
4 INJECTION, SOLUTION INTRA-ARTICULAR; INTRALESIONAL; INTRAMUSCULAR; INTRAVENOUS; SOFT TISSUE
Status: COMPLETED | OUTPATIENT
Start: 2024-01-21 | End: 2024-01-21

## 2024-01-21 RX ORDER — GUAIFENESIN 600 MG/1
1200 TABLET, EXTENDED RELEASE ORAL 2 TIMES DAILY
Qty: 40 TABLET | Refills: 0 | Status: SHIPPED | OUTPATIENT
Start: 2024-01-21 | End: 2024-01-31

## 2024-01-21 RX ORDER — AZITHROMYCIN 250 MG/1
TABLET, FILM COATED ORAL
Qty: 6 TABLET | Refills: 0 | Status: SHIPPED | OUTPATIENT
Start: 2024-01-21 | End: 2024-01-26

## 2024-01-21 RX ADMIN — DEXAMETHASONE SODIUM PHOSPHATE 4 MG: 4 INJECTION, SOLUTION INTRA-ARTICULAR; INTRALESIONAL; INTRAMUSCULAR; INTRAVENOUS; SOFT TISSUE at 08:01

## 2024-01-21 NOTE — PATIENT INSTRUCTIONS
Drink lots of fluids, such as water, broth, sports drinks, and tea. This will keep your fluid levels up.  You need to rest while you are getting better.  Get enough sleep.  Use a machine that makes steam like a vaporizer or humidifier. It may help open up a clogged nose so you can breathe easier.  Tylenol or Motrin as needed for pain or fever  Nasal saline for congestion  Ok to start zpack if not feeling better   To ED for worsening symptoms such as shortness of breath or chest pain

## 2024-01-21 NOTE — LETTER
January 21, 2024    Robert Sanders  [id number]   [place] [address]             [facility]  Family [facility]  [address]  [place] [address]  Phone: [phone]  Fax: [phone]   January 21, 2024     Patient: Robert Sanders   YOB: 1975   Date of Visit: 1/21/2024       To Whom it May Concern:    Robert Sanders was seen in my clinic on 1/21/2024. He may return to work on 01/24/2024 .    Please excuse him from any classes or work missed.    If you have any questions or concerns, please don't hesitate to call.    Sincerely,         Diane Almeida FNP

## 2024-01-21 NOTE — PROGRESS NOTES
"[facility] Medicine    Chief Complaint      Chief Complaint   Patient presents with    Cough    Fatigue    Generalized Body Aches    Chest Congestion    Back Pain     Pt states s/s started Friday.       History of Present Illness      Robert Sanders is a 48 y.o. male with chronic conditions of allergies who presents today for flu like symptoms x's 2 days.    Cough  Associated symptoms include myalgias. Pertinent negatives include no chest pain, shortness of breath or wheezing.   Fatigue  Associated symptoms include coughing, fatigue and myalgias. Pertinent negatives include no chest pain.   Back Pain  Pertinent negatives include no chest pain.       Past Medical History:  Past Medical History:   Diagnosis Date    Allergy        Past Surgical History:   has a past surgical history that includes Knee arthroscopy w/ meniscectomy (Left, 03/01/2023); Arthroscopic chondroplasty of knee joint (Left, 03/01/2023); and Adenoidectomy.    Social History:  Social History     Tobacco Use    Smoking status: Every Day     Current packs/day: 0.50     Types: Cigarettes, Vaping with nicotine    Smokeless tobacco: Never    Tobacco comments:     Cigarettes are down to 0.25 packs/day   Substance Use Topics    Alcohol use: Yes    Drug use: Never       I personally reviewed all past medical, surgical, and social.     Review of Systems   Constitutional:  Positive for fatigue.   Respiratory:  Positive for cough. Negative for shortness of breath and wheezing.         "Chest congestion"   Cardiovascular:  Negative for chest pain.   Musculoskeletal:  Positive for back pain and myalgias.        Medications:  Outpatient Encounter Medications as of 1/21/2024   Medication Sig Dispense Refill    clonazePAM (KLONOPIN) 1 MG tablet Take 1 tablet (1 mg total) by mouth daily as needed for Anxiety. 30 tablet 1    omeprazole (PRILOSEC) 20 MG capsule Take 20 mg by mouth once daily.      azithromycin (Z-MELA) 250 MG tablet Take 2 tablets by mouth on day 1; " "Take 1 tablet by mouth on days 2-5 6 tablet 0    guaiFENesin (MUCINEX) 600 mg 12 hr tablet Take 2 tablets (1,200 mg total) by mouth 2 (two) times daily. for 10 days 40 tablet 0    ibuprofen (ADVIL,MOTRIN) 800 MG tablet Take 1 tablet (800 mg total) by mouth every 8 (eight) hours as needed for Pain or Temperature greater than. 30 tablet 0    oseltamivir (TAMIFLU) 75 MG capsule Take 1 capsule (75 mg total) by mouth 2 (two) times daily. for 5 days 10 capsule 0     Facility-Administered Encounter Medications as of 1/21/2024   Medication Dose Route Frequency Provider Last Rate Last Admin    dexAMETHasone injection 4 mg  4 mg Intramuscular 1 time in Clinic/HOD Diane Almeida FNP           Allergies:  Review of patient's allergies indicates:  No Known Allergies    Health Maintenance:    There is no immunization history on file for this patient.   Health Maintenance   Topic Date Due    Hepatitis C Screening  Never done    Lipid Panel  Never done    TETANUS VACCINE  Never done    Colorectal Cancer Screening  Never done        Physical Exam      Vital Signs  Temp: 99.9 °F (37.7 °C)  Temp Source: Oral  Pulse: (!) 116  SpO2: 96 %  BP: (!) 155/91  BP Location: Left arm  Patient Position: Sitting  Height and Weight  Height: 5' 9" (175.3 cm)  Weight: 73.2 kg (161 lb 5 oz)  BSA (Calculated - sq m): 1.89 sq meters  BMI (Calculated): 23.8  Weight in (lb) to have BMI = 25: 168.9]    Physical Exam  Constitutional:       Appearance: Normal appearance. He is ill-appearing.   HENT:      Right Ear: Tympanic membrane is erythematous.      Left Ear: Tympanic membrane normal.      Nose: Congestion present.      Mouth/Throat:      Pharynx: Posterior oropharyngeal erythema present.   Cardiovascular:      Rate and Rhythm: Regular rhythm. Tachycardia present.   Pulmonary:      Effort: Pulmonary effort is normal.      Breath sounds: Normal breath sounds.   Neurological:      General: No focal deficit present.      Mental Status: He is alert " and oriented to person, place, and time. Mental status is at baseline.          Laboratory:  CBC:      CMP:  Recent Labs   Lab 02/24/23  1510   Glucose 99   Calcium 9.2   Sodium 138   Potassium 3.8   CO2 28   Chloride 105   BUN 9     LIPIDS:      TSH:      A1C:        Assessment/Plan     Robert Sanders is a 48 y.o.male with:    1. Influenza A  -     POCT COVID-19 Rapid Screening  -     POCT Influenza A/B Rapid Antigen  -     oseltamivir (TAMIFLU) 75 MG capsule; Take 1 capsule (75 mg total) by mouth 2 (two) times daily. for 5 days  Dispense: 10 capsule; Refill: 0  -     ibuprofen (ADVIL,MOTRIN) 800 MG tablet; Take 1 tablet (800 mg total) by mouth every 8 (eight) hours as needed for Pain or Temperature greater than.  Dispense: 30 tablet; Refill: 0  -     azithromycin (Z-MELA) 250 MG tablet; Take 2 tablets by mouth on day 1; Take 1 tablet by mouth on days 2-5  Dispense: 6 tablet; Refill: 0  -     guaiFENesin (MUCINEX) 600 mg 12 hr tablet; Take 2 tablets (1,200 mg total) by mouth 2 (two) times daily. for 10 days  Dispense: 40 tablet; Refill: 0  -     dexAMETHasone injection 4 mg    2. Generalized body aches  -     POCT RAPID STREP A         Chronic conditions status updated as per HPI.  Other than changes above, cont current medications and maintain follow up with specialists.  Return to clinic as needed, to ED for worsening symptoms, follow up with pcp regarding elevated blood pressure.     Patient Instructions   Drink lots of fluids, such as water, broth, sports drinks, and tea. This will keep your fluid levels up.  You need to rest while you are getting better.  Get enough sleep.  Use a machine that makes steam like a vaporizer or humidifier. It may help open up a clogged nose so you can breathe easier.  Tylenol or Motrin as needed for pain or fever  Nasal saline for congestion  Ok to start zpack if not feeling better   To ED for worsening symptoms such as shortness of breath or chest pain      Diane Almeida,  FNP-BC  [facility]

## 2024-02-21 ENCOUNTER — OFFICE VISIT (OUTPATIENT)
Dept: FAMILY MEDICINE | Facility: CLINIC | Age: 49
End: 2024-02-21
Payer: COMMERCIAL

## 2024-02-21 VITALS
OXYGEN SATURATION: 98 % | TEMPERATURE: 97 F | WEIGHT: 158.31 LBS | SYSTOLIC BLOOD PRESSURE: 131 MMHG | DIASTOLIC BLOOD PRESSURE: 89 MMHG | HEIGHT: 68 IN | BODY MASS INDEX: 23.99 KG/M2 | HEART RATE: 96 BPM | RESPIRATION RATE: 16 BRPM

## 2024-02-21 DIAGNOSIS — J32.9 SINUSITIS, UNSPECIFIED CHRONICITY, UNSPECIFIED LOCATION: Primary | ICD-10-CM

## 2024-02-21 DIAGNOSIS — R05.9 COUGH, UNSPECIFIED TYPE: ICD-10-CM

## 2024-02-21 LAB
CTP QC/QA: YES
CTP QC/QA: YES
FLUAV AG NPH QL: NEGATIVE
FLUBV AG NPH QL: NEGATIVE
SARS-COV-2 RDRP RESP QL NAA+PROBE: NEGATIVE

## 2024-02-21 PROCEDURE — 3008F BODY MASS INDEX DOCD: CPT | Mod: ,,, | Performed by: STUDENT IN AN ORGANIZED HEALTH CARE EDUCATION/TRAINING PROGRAM

## 2024-02-21 PROCEDURE — 3079F DIAST BP 80-89 MM HG: CPT | Mod: ,,, | Performed by: STUDENT IN AN ORGANIZED HEALTH CARE EDUCATION/TRAINING PROGRAM

## 2024-02-21 PROCEDURE — 87635 SARS-COV-2 COVID-19 AMP PRB: CPT | Mod: QW,,, | Performed by: STUDENT IN AN ORGANIZED HEALTH CARE EDUCATION/TRAINING PROGRAM

## 2024-02-21 PROCEDURE — 1159F MED LIST DOCD IN RCRD: CPT | Mod: ,,, | Performed by: STUDENT IN AN ORGANIZED HEALTH CARE EDUCATION/TRAINING PROGRAM

## 2024-02-21 PROCEDURE — 87804 INFLUENZA ASSAY W/OPTIC: CPT | Mod: QW,,, | Performed by: STUDENT IN AN ORGANIZED HEALTH CARE EDUCATION/TRAINING PROGRAM

## 2024-02-21 PROCEDURE — 3075F SYST BP GE 130 - 139MM HG: CPT | Mod: ,,, | Performed by: STUDENT IN AN ORGANIZED HEALTH CARE EDUCATION/TRAINING PROGRAM

## 2024-02-21 PROCEDURE — 96372 THER/PROPH/DIAG INJ SC/IM: CPT | Mod: ,,, | Performed by: STUDENT IN AN ORGANIZED HEALTH CARE EDUCATION/TRAINING PROGRAM

## 2024-02-21 PROCEDURE — 99214 OFFICE O/P EST MOD 30 MIN: CPT | Mod: 25,,, | Performed by: STUDENT IN AN ORGANIZED HEALTH CARE EDUCATION/TRAINING PROGRAM

## 2024-02-21 RX ORDER — AZITHROMYCIN 250 MG/1
TABLET, FILM COATED ORAL
Qty: 6 TABLET | Refills: 0 | Status: SHIPPED | OUTPATIENT
Start: 2024-02-21 | End: 2024-02-26

## 2024-02-21 RX ORDER — CEFTRIAXONE 1 G/1
1 INJECTION, POWDER, FOR SOLUTION INTRAMUSCULAR; INTRAVENOUS
Status: COMPLETED | OUTPATIENT
Start: 2024-02-21 | End: 2024-02-21

## 2024-02-21 RX ORDER — METHYLPREDNISOLONE 4 MG/1
TABLET ORAL
Qty: 21 EACH | Refills: 0 | Status: SHIPPED | OUTPATIENT
Start: 2024-02-21 | End: 2024-03-13

## 2024-02-21 RX ORDER — IPRATROPIUM BROMIDE 21 UG/1
2 SPRAY, METERED NASAL 2 TIMES DAILY
Qty: 30 ML | Refills: 0 | Status: SHIPPED | OUTPATIENT
Start: 2024-02-21

## 2024-02-21 RX ADMIN — CEFTRIAXONE 1 G: 1 INJECTION, POWDER, FOR SOLUTION INTRAMUSCULAR; INTRAVENOUS at 09:02

## 2024-02-21 NOTE — PROGRESS NOTES
[facility]    Chief Complaint      Chief Complaint   Patient presents with    Cough    Chest Congestion     Otc mucinex used    Nasal Congestion     Headache congestion; pressure    Documentation Only     Pt stated that he went to [facility]'s ED a week after he came here last. Doctor there told him to stop taking tamiflu. States did go away but now came back and worse.  doesn't want a cxr just steroids/shots.       History of Present Illness      Robert Sanders is a 48 y.o. male with chronic conditions of allergies who presents today for  upper resp symptoms that started this past Sunday. Declined cxr,  has upper respiratory infections twice per year,  had pneumonia before and this doesn't feel like it.       Past Medical History:  Past Medical History:   Diagnosis Date    Allergy        Past Surgical History:   has a past surgical history that includes Knee arthroscopy w/ meniscectomy (Left, 03/01/2023); Arthroscopic chondroplasty of knee joint (Left, 03/01/2023); and Adenoidectomy.    Social History:  Social History     Tobacco Use    Smoking status: Every Day     Current packs/day: 0.50     Types: Cigarettes, Vaping with nicotine    Smokeless tobacco: Never    Tobacco comments:     Cigarettes are down to 0.25 packs/day   Substance Use Topics    Alcohol use: Yes    Drug use: Never       I personally reviewed all past medical, surgical, and social.     Review of Systems   HENT:  Positive for nasal congestion, postnasal drip and sinus pressure/congestion.    Respiratory:  Positive for cough. Negative for shortness of breath and wheezing.         Chest congestion    Cardiovascular:  Negative for chest pain.        Medications:  Outpatient Encounter Medications as of 2/21/2024   Medication Sig Dispense Refill    clonazePAM (KLONOPIN) 1 MG tablet Take 1 tablet (1 mg total) by mouth daily as needed for Anxiety. 30 tablet 1    ibuprofen (ADVIL,MOTRIN) 800 MG tablet Take 1 tablet (800 mg total)  "by mouth every 8 (eight) hours as needed for Pain or Temperature greater than. 30 tablet 0    omeprazole (PRILOSEC) 20 MG capsule Take 20 mg by mouth once daily.      azithromycin (Z-MELA) 250 MG tablet Take 2 tablets by mouth on day 1; Take 1 tablet by mouth on days 2-5 6 tablet 0    ipratropium (ATROVENT) 21 mcg (0.03 %) nasal spray 2 sprays by Each Nostril route 2 (two) times daily. 30 mL 0    methylPREDNISolone (MEDROL DOSEPACK) 4 mg tablet use as directed 21 each 0     Facility-Administered Encounter Medications as of 2/21/2024   Medication Dose Route Frequency Provider Last Rate Last Admin    [COMPLETED] cefTRIAXone injection 1 g  1 g Intramuscular 1 time in Clinic/HOD Diane Almeida FNP   1 g at 02/21/24 0944       Allergies:  Review of patient's allergies indicates:  No Known Allergies    Health Maintenance:    There is no immunization history on file for this patient.   Health Maintenance   Topic Date Due    Hepatitis C Screening  Never done    Lipid Panel  Never done    TETANUS VACCINE  Never done    Colorectal Cancer Screening  Never done        Physical Exam      Vital Signs  Temp: 97.3 °F (36.3 °C)  Temp Source: Oral  Pulse: 96  Resp: 16  SpO2: 98 %  BP: 131/89  BP Location: Left arm  Patient Position: Sitting  Height and Weight  Height: 5' 8" (172.7 cm)  Weight: 71.8 kg (158 lb 4.8 oz)  BSA (Calculated - sq m): 1.86 sq meters  BMI (Calculated): 24.1  Weight in (lb) to have BMI = 25: 164.1]    Physical Exam  Constitutional:       Appearance: Normal appearance. He is not ill-appearing.   HENT:      Right Ear: Tympanic membrane normal.      Nose: Congestion present. No rhinorrhea.      Right Sinus: No maxillary sinus tenderness or frontal sinus tenderness.      Left Sinus: No maxillary sinus tenderness or frontal sinus tenderness.      Mouth/Throat:      Pharynx: No posterior oropharyngeal erythema.   Cardiovascular:      Rate and Rhythm: Normal rate and regular rhythm.   Pulmonary:      Effort: " Pulmonary effort is normal.      Breath sounds: Normal breath sounds.   Skin:     General: Skin is warm and dry.   Neurological:      General: No focal deficit present.      Mental Status: He is alert and oriented to person, place, and time. Mental status is at baseline.          Laboratory:  CBC:      CMP:  Recent Labs   Lab 02/24/23  1510   Glucose 99   Calcium 9.2   Sodium 138   Potassium 3.8   CO2 28   Chloride 105   BUN 9     LIPIDS:      TSH:      A1C:        Assessment/Plan     Robert Sanders is a 48 y.o.male with:    1. Sinusitis, unspecified chronicity, unspecified location  -     azithromycin (Z-MELA) 250 MG tablet; Take 2 tablets by mouth on day 1; Take 1 tablet by mouth on days 2-5  Dispense: 6 tablet; Refill: 0  -     ipratropium (ATROVENT) 21 mcg (0.03 %) nasal spray; 2 sprays by Each Nostril route 2 (two) times daily.  Dispense: 30 mL; Refill: 0  -     methylPREDNISolone (MEDROL DOSEPACK) 4 mg tablet; use as directed  Dispense: 21 each; Refill: 0  -     cefTRIAXone injection 1 g    2. Cough, unspecified type  -     POCT COVID-19 Rapid Screening  -     POCT Influenza A/B         Chronic conditions status updated as per HPI.  Other than changes above, cont current medications and maintain follow up with specialists.  Return to clinic as needed.     Patient Instructions   Try to thin the mucus.  Drink lots of liquids to stay hydrated.  Use a cool mist humidifier to avoid dry air.  Use saline nose drops or a saline nose rinse to relieve stuffiness.  Wash your hands often. This will help keep others healthy.  Do not smoke or be in smoke-filled places. Avoid things that may cause breathing problems like fumes, pollution, dust, and other common allergens and irritants.  You may want to take medicine like ibuprofen, naproxen, or acetaminophen to help with pain.     Diane Almeida, FNP-BC  [facility]

## 2024-02-21 NOTE — PATIENT INSTRUCTIONS
Try to thin the mucus.  Drink lots of liquids to stay hydrated.  Use a cool mist humidifier to avoid dry air.  Use saline nose drops or a saline nose rinse to relieve stuffiness.  Wash your hands often. This will help keep others healthy.  Do not smoke or be in smoke-filled places. Avoid things that may cause breathing problems like fumes, pollution, dust, and other common allergens and irritants.  You may want to take medicine like ibuprofen, naproxen, or acetaminophen to help with pain.

## 2024-02-27 ENCOUNTER — OFFICE VISIT (OUTPATIENT)
Dept: FAMILY MEDICINE | Facility: CLINIC | Age: 49
End: 2024-02-27
Payer: COMMERCIAL

## 2024-02-27 VITALS
HEART RATE: 106 BPM | DIASTOLIC BLOOD PRESSURE: 74 MMHG | SYSTOLIC BLOOD PRESSURE: 128 MMHG | RESPIRATION RATE: 18 BRPM | HEIGHT: 68 IN | TEMPERATURE: 98 F | BODY MASS INDEX: 23.95 KG/M2 | OXYGEN SATURATION: 96 % | WEIGHT: 158 LBS

## 2024-02-27 DIAGNOSIS — Z76.89 ENCOUNTER TO ESTABLISH CARE: ICD-10-CM

## 2024-02-27 DIAGNOSIS — Z13.220 SCREENING FOR LIPID DISORDERS: ICD-10-CM

## 2024-02-27 DIAGNOSIS — Z00.00 ENCOUNTER FOR GENERAL ADULT MEDICAL EXAMINATION WITHOUT ABNORMAL FINDINGS: Primary | ICD-10-CM

## 2024-02-27 DIAGNOSIS — Z13.1 SCREENING FOR DIABETES MELLITUS: ICD-10-CM

## 2024-02-27 LAB
CHOLEST SERPL-MCNC: 239 MG/DL (ref 0–200)
CHOLEST/HDLC SERPL: 3.1 {RATIO}
GLUCOSE SERPL-MCNC: 99 MG/DL (ref 74–106)
HDLC SERPL-MCNC: 78 MG/DL (ref 40–60)
LDLC SERPL CALC-MCNC: 137 MG/DL
LDLC/HDLC SERPL: 1.8 {RATIO}
NONHDLC SERPL-MCNC: 161 MG/DL
TRIGL SERPL-MCNC: 119 MG/DL (ref 35–150)
VLDLC SERPL-MCNC: 24 MG/DL

## 2024-02-27 PROCEDURE — 3078F DIAST BP <80 MM HG: CPT | Mod: ,,, | Performed by: STUDENT IN AN ORGANIZED HEALTH CARE EDUCATION/TRAINING PROGRAM

## 2024-02-27 PROCEDURE — 3008F BODY MASS INDEX DOCD: CPT | Mod: ,,, | Performed by: STUDENT IN AN ORGANIZED HEALTH CARE EDUCATION/TRAINING PROGRAM

## 2024-02-27 PROCEDURE — 80061 LIPID PANEL: CPT | Mod: ,,, | Performed by: CLINICAL MEDICAL LABORATORY

## 2024-02-27 PROCEDURE — 82947 ASSAY GLUCOSE BLOOD QUANT: CPT | Mod: ,,, | Performed by: CLINICAL MEDICAL LABORATORY

## 2024-02-27 PROCEDURE — 1159F MED LIST DOCD IN RCRD: CPT | Mod: ,,, | Performed by: STUDENT IN AN ORGANIZED HEALTH CARE EDUCATION/TRAINING PROGRAM

## 2024-02-27 PROCEDURE — 99396 PREV VISIT EST AGE 40-64: CPT | Mod: ,,, | Performed by: STUDENT IN AN ORGANIZED HEALTH CARE EDUCATION/TRAINING PROGRAM

## 2024-02-27 PROCEDURE — 3074F SYST BP LT 130 MM HG: CPT | Mod: ,,, | Performed by: STUDENT IN AN ORGANIZED HEALTH CARE EDUCATION/TRAINING PROGRAM

## 2024-02-27 NOTE — PATIENT INSTRUCTIONS
Patient Education       Smoking: Not Just Harmful to Your Lungs and Heart   About this topic   Smoking is very common at any age. It is one of the leading causes of poor health and illness. Smoking can lead to death. It has many harmful chemicals that are released by the tobacco you smoke and inhale. Tobacco has many forms. These are:  Cigarettes  Pipes  Cigars  Chewing tobacco  Electronic cigarettes  Loose  Hookah  All kinds of tobaccos contain many toxic substances. These are what make you sick from smoking.  Nicotine - The main thing that makes you addicted to smoking  Carbon monoxide - A poison that gets into your blood when smoking  Tar - Has chemicals that are cancerous  Lead and many others  These factors affect how much damage smoking will have on you:  How much you smoke  What you smoke  How what you smoke has been prepared  The content of what you smoke  Smoking hurts every part of the body. The lungs and the heart are most often affected by tobacco use.  General   Smoking is very harmful to your body. It can cause many illnesses and can affect how you look.  Smoking and Cancer   Smoking is the most common cause of lung cancer. Smoking may also increase the risk of:  Mouth cancer. This can also be caused by chewing tobacco.  Bladder cancer  Cancer of the tube from the mouth to stomach. This is also called the esophagus.  Cancer of the throat. This can also be caused by chewing tobacco.  Kidney cancer  Liver cancer  Stomach, colon, and rectal cancer  Cancer of the pancreas  Cancer of the cervix in women  Cancer of the blood or leukemia  Skin cancer  Smoking and Your Lungs   If you smoke you are more likely to have:  Breathing problems  Lung infections like pneumonia or bronchitis  Lung disease such as COPD or emphysema  Asthma  Smoking and the Heart and Circulation   Causes heart disease and stroke  Smokers are at a higher risk for high blood pressure  You are more likely to get blood clots  Smoking can  lower blood flow to the legs and skin  Smoking and Diabetes   If you smoke, you:  Have a higher risk of developing diabetes  May have higher blood sugar levels  May have more problems with your diabetes  Smoking and Digestion   Smokers make more stomach acid. This can cause sores or ulcers in your belly or bowel.  Your stomach acids may flow back to your esophagus. This is also called heartburn.  You have more chance of bowel irritation and swelling  Smoking and Your Bones   If you smoke:  Your bone-forming cells don't grow as fast  Your bones may become weaker (osteoporosis)  You may have more low back pain and arthritis  You may have more overuse injuries  You may have a greater risk of breaking bones  Your broken bones may take longer to heal  Smoking and Pregnancy   Makes your baby more prone to problems  You have a higher chance of having a premature baby or baby born early  Your healthy baby may die without reason. This is called sudden infant death syndrome.  Your baby may be born dead (stillbirth)  Your baby may have a low birth weight  You have a higher risk of an ectopic pregnancy or a pregnancy outside of the uterus  You have a higher chance of having a baby with mouth or facial defects  Smoking and Your Eyes, Hair, Hands, and Mouth   If you smoke, you may notice your:  Eyes become cloudy and form cataracts  Hair may get thin and turn gray sooner than it should  Fingernails turn yellow  Teeth become yellowish brown  Gums have more problems  Teeth have problems or even become loose  Sense of taste and smell start to go away  Breath smells bad  Smoking and Skin   Smoking causes:  Less blood flow to the skin  Wrinkles start early around your eyes and mouth  Dry skin  Your skin to lose elasticity and strength  Skin may get splotchy or pale  Your face to look thin and old. This is called smoker's face.  Greater risk of getting a skin problem called psoriasis  Slower healing of cuts or bruises  Smoking and Sex  Life   If you smoke you are more likely to have problems like:  Impotence  Decreased blood flow to the penis. This is also called erectile dysfunction.  Trouble getting pregnant  Early change of life or menopause for women  A higher risk of heart attack or stroke for women who smoke and use birth control pills  Damaged sperm that may lead to problems getting a woman pregnant, birth defects, or miscarriage  Smoking and Your Brain and Behavior   Smoking can:  Affect your mood  Lead to addiction  Cause long-term confusion or damage to your brain cells  Cause low mood  Smoking and Children   If you smoke, your children:  Will be more likely to smoke.  Can be sick from being around your smoke. This is called secondhand smoke.  Need to learn about the bad effects of smoking. Most smokers start before the age of 18. Encourage your children never to smoke.  Smoking and Other Effects   Smoking can cause:  Wounds to take longer to heal  You to eat poorly  Weight loss  Swelling in the body and affect the body's immune or defense system  Rheumatoid arthritis  Greater chance of injury  You to get warts easier (human papillomavirus)  A bad odor in hair and on clothes  You to have poor sports performance  You to spend a lot of money. Smoking is an expensive habit. A smoker who smokes a pack per day in the [place] will spend over $2000 per year on cigarettes.  Health care to cost more  You to miss work more often  Hearing loss  Even if you have smoked for many years, it is not too late to quit. Your body starts to heal as soon as you stop smoking. It is better to quit when you are young, but you can still get healthier if you quit at any age.       Helpful tips   Some helpful steps you can take to help you quit smoking:  Set a date to quit smoking.  Know the reasons that make you smoke more.  Know why you want to quit smoking.  Write down each time you smoke. Include the time and what you are doing. Plan ahead about what you will do  instead of smoking when that time or event reappears.  Tell your family and friends about your plan to quit smoking. Let them know how to help you.  Slowly reduce your smoking.  Remove smoking and tobacco products from your home and other places.  Avoid places and situations where you will more likely smoke. If people close to you smoke, ask them to quit with you. If they do not quit, ask them to not smoke around you.  Reward or treat yourself every time you do not smoke. Do not use food as a reward.  Ask a doctor for help.  Talk with your doctor before you try an electronic cigarette.  Where can I learn more?   Centers for Disease Control and Prevention  http://www.cdc.gov/tobacco/data_statistics/fact_sheets/health_effects/effects_cig_smoking/#overview   Centers for Disease Control and Prevention  https://www.cdc.gov/tobacco/campaign/tips/quit-smoking/guide/quit-plan.html?s_cid=OSH_tips_D9400   KidsHealth  http://[employer].org/teen/drug_alcohol/tobacco/smoking.html#   [place] Food and Drug Administration  https://www.fda.gov/TobaccoProducts/Labeling/ProductsIngredientsComponents/default.htm   Last Reviewed Date   2021-03-31  Consumer Information Use and Disclaimer   This information is not specific medical advice and does not replace information you receive from your health care provider. This is only a brief summary of general information. It does NOT include all information about conditions, illnesses, injuries, tests, procedures, treatments, therapies, discharge instructions or life-style choices that may apply to you. You must talk with your health care provider for complete information about your health and treatment options. This information should not be used to decide whether or not to accept your health care providers advice, instructions or recommendations. Only your health care provider has the knowledge and training to provide advice that is right for you.  Copyright   Copyright © 2021 [employer] and  its affiliates and/or licensors. All rights reserved.

## 2024-02-27 NOTE — PROGRESS NOTES
"  Subjective     Patient ID: Robert Sanders is a 48 y.o. male.    Chief Complaint: Healthy You (Nonfasting for labs(states had Dr. Pepper))    HPI  Pt in to clinic to establish care and healthy you. Has hx of meniscal tear left knee, otherwise no issues or concerns today.       Review of Systems   Constitutional:  Negative for fatigue and fever.   HENT:  Negative for sore throat.    Respiratory:  Negative for shortness of breath.    Cardiovascular:  Negative for chest pain.   Gastrointestinal:  Negative for abdominal pain.   Genitourinary:  Negative for dysuria.   Musculoskeletal:  Negative for back pain.   Integumentary:  Negative for rash.   Neurological:  Negative for headaches.   All other systems reviewed and are negative.      Tobacco Use: High Risk (2/27/2024)    Patient History     Smoking Tobacco Use: Every Day     Smokeless Tobacco Use: Never     Passive Exposure: Not on file     Review of patient's allergies indicates:  No Known Allergies  Current Outpatient Medications   Medication Instructions    clonazePAM (KLONOPIN) 1 mg, Oral, Daily PRN    ibuprofen (ADVIL,MOTRIN) 800 mg, Oral, Every 8 hours PRN    ipratropium (ATROVENT) 21 mcg (0.03 %) nasal spray 2 sprays, Each Nostril, 2 times daily    methylPREDNISolone (MEDROL DOSEPACK) 4 mg tablet use as directed    omeprazole (PRILOSEC) 20 mg, Oral, Daily     There are no discontinued medications.    Past Medical History:   Diagnosis Date    Allergy      Health Maintenance Topics with due status: Not Due       Topic Last Completion Date    Lipid Panel 02/27/2024       There is no immunization history on file for this patient.    Objective     Body mass index is 24.02 kg/m².  Wt Readings from Last 3 Encounters:   02/27/24 71.7 kg (158 lb)   02/21/24 71.8 kg (158 lb 4.8 oz)   01/21/24 73.2 kg (161 lb 5 oz)     Ht Readings from Last 3 Encounters:   02/27/24 5' 8" (1.727 m)   02/21/24 5' 8" (1.727 m)   01/21/24 5' 9" (1.753 m)     BP Readings from Last 3 " Encounters:   02/27/24 128/74   02/21/24 131/89   01/21/24 (!) 155/91     Temp Readings from Last 3 Encounters:   02/27/24 98.4 °F (36.9 °C) (Oral)   02/21/24 97.3 °F (36.3 °C) (Oral)   01/21/24 99.9 °F (37.7 °C) (Oral)     Pulse Readings from Last 3 Encounters:   02/27/24 106   02/21/24 96   01/21/24 (!) 116     Resp Readings from Last 3 Encounters:   02/27/24 18   02/21/24 16   03/01/23 11     PF Readings from Last 3 Encounters:   01/31/23 77 L/min       Physical Exam  Constitutional:       Appearance: Normal appearance.   HENT:      Head: Normocephalic and atraumatic.      Right Ear: Hearing and external ear normal.      Left Ear: Hearing and external ear normal.   Eyes:      Extraocular Movements: Extraocular movements intact.      Conjunctiva/sclera: Conjunctivae normal.   Neck:      Thyroid: No thyroid mass, thyromegaly or thyroid tenderness.   Cardiovascular:      Rate and Rhythm: Normal rate and regular rhythm.      Heart sounds: Normal heart sounds.   Pulmonary:      Effort: Pulmonary effort is normal.      Breath sounds: Normal breath sounds.   Abdominal:      General: Bowel sounds are normal.      Palpations: Abdomen is soft.   Musculoskeletal:      Cervical back: Normal range of motion and neck supple.      Right lower leg: No edema.      Left lower leg: No edema.   Skin:     General: Skin is warm and dry.   Neurological:      General: No focal deficit present.      Mental Status: He is alert and oriented to person, place, and time.      Gait: Gait normal.   Psychiatric:         Mood and Affect: Mood normal.         Behavior: Behavior normal.         Thought Content: Thought content normal.         Judgment: Judgment normal.         Assessment and Plan     Problem List Items Addressed This Visit    None  Visit Diagnoses       Encounter for general adult medical examination with abnormal findings    -  Primary    Screening for diabetes mellitus        Relevant Orders    Glucose, Fasting (Completed)     Screening for lipid disorders        Relevant Orders    Lipid Panel (Completed)    Encounter to establish care                Plan:   -Continue present medications, make appt to return and complete other care gaps     I have reviewed the medications, allergies, and problem list.     Goal Actions:    What type of visit is the patient here for today?: Healthy You  Does the patient consent to enroll in Color Me Healthy?: Yes  Is this a Wellness Follow Up?: No  What is your overall wellness goal? (select at least one): Lifestyle modifications, Improve overall health, Understand disease process  Choose 3: Lifestyle, Tobacco, Exercise  Lifestyle Actions : Schedule colonoscopy, sigmoidoscopy, or Colorguard if applicable, Take medications as prescribed, Develop good support system  Exercise Actions: Recommend physical activity 30 minutes per day 3-5 times/week  Tobacco Actions: Avoid all tobacco products including 2nd and 3rd hand exposure, Refer for tobacco free if part of plan

## 2024-02-28 ENCOUNTER — PATIENT OUTREACH (OUTPATIENT)
Dept: ADMINISTRATIVE | Facility: HOSPITAL | Age: 49
End: 2024-02-28

## 2024-02-28 NOTE — PROGRESS NOTES
Population Health Chart Review & Patient Outreach Details      Further Action Needed If Patient Returns Outreach:            Updates Requested / Reviewed:     []  Care Everywhere    []     []  External Sources ([facility], [facility], [facility], etc.)    [] [facility]   [] [facility]   [] Other:    []  Care Team Updated   []  Removed  or Duplicate Orders   []  Immunization Reconciliation Completed / Queried    [] [place]   [] [place]   [] [place]   [] [place]      Health Maintenance Topics Addressed and Outreach Outcomes / Actions Taken:             Breast Cancer Screening []  Mammogram Order Placed    []  Mammogram Screening Scheduled    []  External Records Requested & Care Team Updated if Applicable    []  External Records Uploaded & Care Team Updated if Applicable    []  Pt Declined Scheduling Mammogram    []  Pt Will Schedule with External Provider / Order Routed & Care Team Updated if Applicable              Cervical Cancer Screening []  Pap Smear Scheduled in Primary Care or OBGYN    []  External Records Requested & Care Team Updated if Applicable       []  External Records Uploaded, Care Team Updated, & History Updated if Applicable    []  Patient Declined Scheduling Pap Smear    []  Patient Will Schedule with External Provider & Care Team Updated if Applicable                  Colorectal Cancer Screening []  Colonoscopy Case Request / Referral / Home Test Order Placed    []  External Records Requested & Care Team Updated if Applicable    []  External Records Uploaded, Care Team Updated, & History Updated if Applicable    []  Patient Declined Completing Colon Cancer Screening    []  Patient Will Schedule with External Provider & Care Team Updated if Applicable    []  Fit Kit Mailed (add the SmartPhrase under additional notes)    []  Reminded Patient to Complete Home Test                Diabetic Eye Exam []  Eye Exam Screening Order Placed    []  Eye Camera Scheduled or Optometry/Ophthalmology Referral  Placed    []  External Records Requested & Care Team Updated if Applicable    []  External Records Uploaded, Care Team Updated, & History Updated if Applicable    []  Patient Declined Scheduling Eye Exam    []  Patient Will Schedule with External Provider & Care Team Updated if Applicable             Blood Pressure Control []  Primary Care Follow Up Visit Scheduled     []  Remote Blood Pressure Reading Captured    []  Patient Declined Remote Reading or Scheduling Appt - Escalated to PCP    []  Patient Will Call Back or Send Portal Message with Reading                 HbA1c & Other Labs []  Overdue Lab(s) Ordered    []  Overdue Lab(s) Scheduled    []  External Records Uploaded & Care Team Updated if Applicable    []  Primary Care Follow Up Visit Scheduled     []  Reminded Patient to Complete A1c Home Test    []  Patient Declined Scheduling Labs or Will Call Back to Schedule    []  Patient Will Schedule with External Provider / Order Routed, & Care Team Updated if Applicable           Primary Care Appointment []  Primary Care Appt Scheduled    []  Patient Declined Scheduling or Will Call Back to Schedule    []  Pt Established with External Provider, Updated Care Team, & Informed Pt to Notify Payor if Applicable           Medication Adherence /    Statin Use []  Primary Care Appointment Scheduled    []  Patient Reminded to  Prescription    []  Patient Declined, Provider Notified if Needed    []  Sent Provider Message to Review to Evaluate Pt for Statin, Add Exclusion Dx Codes, Document   Exclusion in Problem List, Change Statin Intensity Level to Moderate or High Intensity if Applicable                Osteoporosis Screening []  Dexa Order Placed    []  Dexa Appointment Scheduled    []  External Records Requested & Care Team Updated    []  External Records Uploaded, Care Team Updated, & History Updated if Applicable    []  Patient Declined Scheduling Dexa or Will Call Back to Schedule    []  Patient Will Schedule  with External Provider / Order Routed & Care Team Updated if Applicable       Additional Notes:.  Post visit Population Health review of encounter with date of service  2/27/24 with Elle.  All required HY components in encounter.  Chart is opened.Manisha Mosher appt for: Pt needs appt for 2025 HY sent to [facility] to schedule via one note

## 2024-03-01 ENCOUNTER — TELEPHONE (OUTPATIENT)
Dept: FAMILY MEDICINE | Facility: CLINIC | Age: 49
End: 2024-03-01
Payer: COMMERCIAL

## 2024-03-01 NOTE — TELEPHONE ENCOUNTER
----- Message from PHIL Russell sent at 3/1/2024  8:08 AM CST -----  Please tell pt that his cholesterol was borderline high, lets try to control with diet and exercise and repeat this lab in 6 weeks.  If still elevated we may need to consider adding a medication.   He needs to follow a low fat/low cholesterol diet, limit fatty or fried foods and convenience foods and eat more albacore tuna and salmon/ omega 3 fatty acid foods.

## 2024-03-25 NOTE — PROGRESS NOTES
HISTORY OF PRESENT ILLNESS:       Arthroscopy, Knee, With Meniscectomy - Left and Arthroscopy, Knee, With Chondroplasty - Left 3/1/2023      Pt is here today for Third post-operative followup of his knee arthroscopy.      he is doing well.  Patient states that the last couple of weeks his knee has been much better.   He is continuing to do physical therapy here at [facility].     Sane score 70    Pre op: 0     We have reviewed his findings and discussed plan of care and future treatment options, including the physical therapy plan.                                                                                     PHYSICAL EXAMINATION:     Incision sites healed well  No evidence of any erythema, infection or induration  Range of motion 0-120 degrees  Minimal effusion  2+ DP pulse  No swelling, no calf tenderness  - Saeid's sign  Negative medial joint line tendernes  Moderate quad atrophy                                                                                 ASSESSMENT:                                                                                                                                               1. Status post above, doing well.                                                                                                                               PLAN:                                                                                                                                                     1. Continue with PT  2. Emphasized quad function.  3. I have discussed return to activity in detail.  4. he will see us back in 6 weeks.                                      5. All questions were answered and he should contact us if he  has any questions or concerns in the interim.              There are no Patient Instructions on file for this visit.         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  Known to our service for  BL SDH with mass effect. S/p BL angelina hole for SDH evac 3/12 followed by BL MMA embo.  Small L ICA aneurysm.  H/o frequent falls (?syncopal events) prior to recent admission, unclear etiology, w/up negative so  far.  1st degree AV block.    Plan:  neurochecks; cEEG  plan for CTh in am; potential OR   Keppra for sz ppx, cont  -160; PRN meds  maintain Osats>92%, incentive spirometry  NPO MN; IVf; as possible OR   monitor e-lytes, UOP  SCDs for VTE ppx, chemoppx held as with worsening SDH      Pt is critically ill and at high risk of rapid deterioration/death due to abovementioned conditions.   Still requires critical care interventions - ongoing frequent evaluations, interventions and management adjustment by the Attending and ICU team, - and included review of relevant history, clinical examination, review of data and images, discussion of treatment with the multidisciplinary team and any consultants involved in this patient’s care.

## 2024-04-15 ENCOUNTER — PATIENT OUTREACH (OUTPATIENT)
Dept: ADMINISTRATIVE | Facility: HOSPITAL | Age: 49
End: 2024-04-15

## 2024-04-15 NOTE — PROGRESS NOTES
Population Health Chart Review & Patient Outreach Details  Per [facility] website, insurance is active and patient is enrolled in CM[facility]:  CMH! Provider CMH! Benefit Start Date CMH! Benefit End Date Baseline Risk Track(s) Baseline Risk Level Current Risk Tracks(s) Current Risk Level   JOHN SANTAMARIA VINCENZO NP 04/15/2024 2025 Hypertension, Cholesterol Moderate Hypertension, Cholesterol Moderate   Color Me Healthy! Services Guide   Color Me [employer]! Services  CPT Codes     Lipid Profile  (0 of 2 Completed) View CPT Codes »    Metabolic Visit  (0 of 2 Completed) View CPT Codes »    Microalbumin  (0 of 1 Completed) View CPT Codes »    Medication Monitoring-Renal  (0 of 2 Completed) View CPT Codes »    Medication Monitoring-Liver  (0 of 2 Completed) View CPT Codes »    Dilated Eye Exam  (0 of 1 Completed) View CPT Codes »    Venipuncture  (0 of 2 Completed) View CPT Codes »    Sent [employer] message to Tamar to schedule [facility] in august        Further Action Needed If Patient Returns Outreach:            Updates Requested / Reviewed:     []  Care Everywhere    []     []  External Sources ([facility], [facility], DIS, etc.)    [] [facility]   [] [facility]   [] Other:    []  Care Team Updated   []  Removed  or Duplicate Orders   []  Immunization Reconciliation Completed / Queried    [] [place]   [] [place]   [] [place]   [] [place]      Health Maintenance Topics Addressed and Outreach Outcomes / Actions Taken:             Breast Cancer Screening []  Mammogram Order Placed    []  Mammogram Screening Scheduled    []  External Records Requested & Care Team Updated if Applicable    []  External Records Uploaded & Care Team Updated if Applicable    []  Pt Declined Scheduling Mammogram    []  Pt Will Schedule with External Provider / Order Routed & Care Team Updated if Applicable              Cervical Cancer Screening []  Pap Smear Scheduled in Primary Care or OBGYN    []  External Records Requested & Care Team Updated if  Applicable       []  External Records Uploaded, Care Team Updated, & History Updated if Applicable    []  Patient Declined Scheduling Pap Smear    []  Patient Will Schedule with External Provider & Care Team Updated if Applicable                  Colorectal Cancer Screening []  Colonoscopy Case Request / Referral / Home Test Order Placed    []  External Records Requested & Care Team Updated if Applicable    []  External Records Uploaded, Care Team Updated, & History Updated if Applicable    []  Patient Declined Completing Colon Cancer Screening    []  Patient Will Schedule with External Provider & Care Team Updated if Applicable    []  Fit Kit Mailed (add the SmartPhrase under additional notes)    []  Reminded Patient to Complete Home Test                Diabetic Eye Exam []  Eye Exam Screening Order Placed    []  Eye Camera Scheduled or Optometry/Ophthalmology Referral Placed    []  External Records Requested & Care Team Updated if Applicable    []  External Records Uploaded, Care Team Updated, & History Updated if Applicable    []  Patient Declined Scheduling Eye Exam    []  Patient Will Schedule with External Provider & Care Team Updated if Applicable             Blood Pressure Control []  Primary Care Follow Up Visit Scheduled     []  Remote Blood Pressure Reading Captured    []  Patient Declined Remote Reading or Scheduling Appt - Escalated to PCP    []  Patient Will Call Back or Send Portal Message with Reading                 HbA1c & Other Labs []  Overdue Lab(s) Ordered    []  Overdue Lab(s) Scheduled    []  External Records Uploaded & Care Team Updated if Applicable    []  Primary Care Follow Up Visit Scheduled     []  Reminded Patient to Complete A1c Home Test    []  Patient Declined Scheduling Labs or Will Call Back to Schedule    []  Patient Will Schedule with External Provider / Order Routed, & Care Team Updated if Applicable           Primary Care Appointment []  Primary Care Appt Scheduled    []   Patient Declined Scheduling or Will Call Back to Schedule    []  Pt Established with External Provider, Updated Care Team, & Informed Pt to Notify Payor if Applicable           Medication Adherence /    Statin Use []  Primary Care Appointment Scheduled    []  Patient Reminded to  Prescription    []  Patient Declined, Provider Notified if Needed    []  Sent Provider Message to Review to Evaluate Pt for Statin, Add Exclusion Dx Codes, Document   Exclusion in Problem List, Change Statin Intensity Level to Moderate or High Intensity if Applicable                Osteoporosis Screening []  Dexa Order Placed    []  Dexa Appointment Scheduled    []  External Records Requested & Care Team Updated    []  External Records Uploaded, Care Team Updated, & History Updated if Applicable    []  Patient Declined Scheduling Dexa or Will Call Back to Schedule    []  Patient Will Schedule with External Provider / Order Routed & Care Team Updated if Applicable       Additional Notes:

## 2024-04-26 DIAGNOSIS — Z13.220 SCREENING FOR LIPID DISORDERS: Primary | ICD-10-CM

## 2024-04-27 DIAGNOSIS — E78.5 HYPERLIPIDEMIA, UNSPECIFIED HYPERLIPIDEMIA TYPE: Primary | ICD-10-CM

## 2024-04-27 RX ORDER — SIMVASTATIN 10 MG/1
10 TABLET, FILM COATED ORAL NIGHTLY
Qty: 90 TABLET | Refills: 1 | Status: SHIPPED | OUTPATIENT
Start: 2024-04-27 | End: 2025-04-27

## 2024-04-29 ENCOUNTER — TELEPHONE (OUTPATIENT)
Dept: FAMILY MEDICINE | Facility: CLINIC | Age: 49
End: 2024-04-29
Payer: COMMERCIAL

## 2024-04-29 NOTE — TELEPHONE ENCOUNTER
----- Message from PHIL Russell sent at 4/27/2024  7:10 PM CDT -----  Please tell pt that his cholesterol is still elevated, I've added a cmp and a statin for him to start taking.

## 2024-05-06 ENCOUNTER — TELEPHONE (OUTPATIENT)
Dept: FAMILY MEDICINE | Facility: CLINIC | Age: 49
End: 2024-05-06
Payer: COMMERCIAL

## 2024-05-06 ENCOUNTER — OFFICE VISIT (OUTPATIENT)
Dept: FAMILY MEDICINE | Facility: CLINIC | Age: 49
End: 2024-05-06
Payer: COMMERCIAL

## 2024-05-06 VITALS
OXYGEN SATURATION: 97 % | BODY MASS INDEX: 23.95 KG/M2 | DIASTOLIC BLOOD PRESSURE: 82 MMHG | SYSTOLIC BLOOD PRESSURE: 130 MMHG | HEART RATE: 83 BPM | HEIGHT: 68 IN | WEIGHT: 158.06 LBS

## 2024-05-06 DIAGNOSIS — G89.29 CHRONIC BILATERAL LOW BACK PAIN, UNSPECIFIED WHETHER SCIATICA PRESENT: Primary | ICD-10-CM

## 2024-05-06 DIAGNOSIS — Z12.11 ENCOUNTER FOR SCREENING COLONOSCOPY: ICD-10-CM

## 2024-05-06 DIAGNOSIS — M54.50 CHRONIC BILATERAL LOW BACK PAIN, UNSPECIFIED WHETHER SCIATICA PRESENT: Primary | ICD-10-CM

## 2024-05-06 PROCEDURE — 1159F MED LIST DOCD IN RCRD: CPT | Mod: ,,, | Performed by: STUDENT IN AN ORGANIZED HEALTH CARE EDUCATION/TRAINING PROGRAM

## 2024-05-06 PROCEDURE — 3075F SYST BP GE 130 - 139MM HG: CPT | Mod: ,,, | Performed by: STUDENT IN AN ORGANIZED HEALTH CARE EDUCATION/TRAINING PROGRAM

## 2024-05-06 PROCEDURE — 99214 OFFICE O/P EST MOD 30 MIN: CPT | Mod: 25,,, | Performed by: STUDENT IN AN ORGANIZED HEALTH CARE EDUCATION/TRAINING PROGRAM

## 2024-05-06 PROCEDURE — 3008F BODY MASS INDEX DOCD: CPT | Mod: ,,, | Performed by: STUDENT IN AN ORGANIZED HEALTH CARE EDUCATION/TRAINING PROGRAM

## 2024-05-06 PROCEDURE — 3079F DIAST BP 80-89 MM HG: CPT | Mod: ,,, | Performed by: STUDENT IN AN ORGANIZED HEALTH CARE EDUCATION/TRAINING PROGRAM

## 2024-05-06 PROCEDURE — 96372 THER/PROPH/DIAG INJ SC/IM: CPT | Mod: ,,, | Performed by: STUDENT IN AN ORGANIZED HEALTH CARE EDUCATION/TRAINING PROGRAM

## 2024-05-06 RX ORDER — METHYLPREDNISOLONE ACETATE 40 MG/ML
40 INJECTION, SUSPENSION INTRA-ARTICULAR; INTRALESIONAL; INTRAMUSCULAR; SOFT TISSUE
Status: COMPLETED | OUTPATIENT
Start: 2024-05-06 | End: 2024-05-06

## 2024-05-06 RX ORDER — KETOROLAC TROMETHAMINE 30 MG/ML
60 INJECTION, SOLUTION INTRAMUSCULAR; INTRAVENOUS
Status: COMPLETED | OUTPATIENT
Start: 2024-05-06 | End: 2024-05-06

## 2024-05-06 RX ORDER — CEPHALEXIN 500 MG/1
500 CAPSULE ORAL 3 TIMES DAILY
COMMUNITY
Start: 2024-03-27

## 2024-05-06 RX ORDER — DEXAMETHASONE SODIUM PHOSPHATE 4 MG/ML
4 INJECTION, SOLUTION INTRA-ARTICULAR; INTRALESIONAL; INTRAMUSCULAR; INTRAVENOUS; SOFT TISSUE
Status: COMPLETED | OUTPATIENT
Start: 2024-05-06 | End: 2024-05-06

## 2024-05-06 RX ADMIN — KETOROLAC TROMETHAMINE 60 MG: 30 INJECTION, SOLUTION INTRAMUSCULAR; INTRAVENOUS at 10:05

## 2024-05-06 RX ADMIN — METHYLPREDNISOLONE ACETATE 40 MG: 40 INJECTION, SUSPENSION INTRA-ARTICULAR; INTRALESIONAL; INTRAMUSCULAR; SOFT TISSUE at 10:05

## 2024-05-06 RX ADMIN — DEXAMETHASONE SODIUM PHOSPHATE 4 MG: 4 INJECTION, SOLUTION INTRA-ARTICULAR; INTRALESIONAL; INTRAMUSCULAR; INTRAVENOUS; SOFT TISSUE at 10:05

## 2024-05-06 NOTE — PROGRESS NOTES
[facility] Medicine    Chief Complaint      Chief Complaint   Patient presents with    Back Pain     Pt states that he is having back pain        History of Present Illness      Robert Sanders is a 48 y.o. male with chronic conditions of chronic pain who presents today for injections.    Pt with history of chronic back and neck pain, in to clinic with request from [facility]. [facility] to have pt receive an anti inflmmatory shot. Document  scanned in to media. Records review revealed that the last steroid injection was given in January of this year.     Past Medical History:  Past Medical History:   Diagnosis Date    Allergy        Past Surgical History:   has a past surgical history that includes Knee arthroscopy w/ meniscectomy (Left, 03/01/2023); Arthroscopic chondroplasty of knee joint (Left, 03/01/2023); and Adenoidectomy.    Social History:  Social History     Tobacco Use    Smoking status: Every Day     Current packs/day: 0.50     Types: Cigarettes, Vaping with nicotine    Smokeless tobacco: Never    Tobacco comments:     Cigarettes are down to 0.25 packs/day   Substance Use Topics    Alcohol use: Yes    Drug use: Never       I personally reviewed all past medical, surgical, and social.     Review of Systems   Respiratory:  Negative for shortness of breath.    Cardiovascular:  Negative for chest pain.   Musculoskeletal:  Positive for back pain (chronic).        Medications:  Outpatient Encounter Medications as of 5/6/2024   Medication Sig Dispense Refill    clonazePAM (KLONOPIN) 1 MG tablet Take 1 tablet (1 mg total) by mouth daily as needed for Anxiety. 30 tablet 1    omeprazole (PRILOSEC) 20 MG capsule Take 20 mg by mouth once daily.      simvastatin (ZOCOR) 10 MG tablet Take 1 tablet (10 mg total) by mouth every evening. 90 tablet 1    cephALEXin (KEFLEX) 500 MG capsule Take 500 mg by mouth 3 (three) times daily. (Patient not taking: Reported on 5/6/2024)      ibuprofen (ADVIL,MOTRIN) 800 MG  "tablet Take 1 tablet (800 mg total) by mouth every 8 (eight) hours as needed for Pain or Temperature greater than. (Patient not taking: Reported on 5/6/2024) 30 tablet 0    ipratropium (ATROVENT) 21 mcg (0.03 %) nasal spray 2 sprays by Each Nostril route 2 (two) times daily. (Patient not taking: Reported on 5/6/2024) 30 mL 0     Facility-Administered Encounter Medications as of 5/6/2024   Medication Dose Route Frequency Provider Last Rate Last Admin    [COMPLETED] dexAMETHasone injection 4 mg  4 mg Intramuscular 1 time in Clinic/HOD Diane Almeida, FNP   4 mg at 05/06/24 1023    [COMPLETED] ketorolac injection 60 mg  60 mg Intramuscular 1 time in Clinic/HOD Diane Almeida, FNP   60 mg at 05/06/24 1025    [COMPLETED] methylPREDNISolone acetate injection 40 mg  40 mg Intramuscular 1 time in Clinic/HOD Diane Almeida, FNP   40 mg at 05/06/24 1024       Allergies:  Review of patient's allergies indicates:  No Known Allergies    Health Maintenance:    There is no immunization history on file for this patient.   Health Maintenance   Topic Date Due    Hepatitis C Screening  Never done    TETANUS VACCINE  Never done    Colorectal Cancer Screening  Never done    Lipid Panel  04/26/2029        Physical Exam      Vital Signs  Pulse: 83  SpO2: 97 %  BP: 130/82  Height and Weight  Height: 5' 8" (172.7 cm)  Weight: 71.7 kg (158 lb 1.1 oz)  BSA (Calculated - sq m): 1.85 sq meters  BMI (Calculated): 24  Weight in (lb) to have BMI = 25: 164.1]    Physical Exam  Constitutional:       Appearance: Normal appearance.   Cardiovascular:      Rate and Rhythm: Normal rate and regular rhythm.   Pulmonary:      Effort: Pulmonary effort is normal.      Breath sounds: Normal breath sounds.   Musculoskeletal:         General: No tenderness or signs of injury.   Skin:     General: Skin is warm and dry.   Neurological:      General: No focal deficit present.      Mental Status: He is alert and oriented to person, place, and time. " Mental status is at baseline.          Laboratory:  CBC:      CMP:  Recent Labs   Lab 04/26/24  0942   Glucose 88   Calcium 9.6   Albumin 4.0   Total Protein 7.3   Sodium 143   Potassium 3.6   CO2 28   Chloride 108 H   BUN 8   Alk Phos 85   ALT 58   AST 87 H   Bilirubin, Total 0.5     LIPIDS:  Recent Labs   Lab 02/27/24  0858 04/26/24  0942   HDL Cholesterol 78 H 82 H   Cholesterol 239 H 239 H   Triglycerides 119 111   LDL Calculated 137 135   Cholesterol/HDL Ratio (Risk Factor) 3.1 2.9   Non- 157     TSH:      A1C:        Assessment/Plan     Robert Sanders is a 48 y.o.male with:    1. Chronic bilateral low back pain, unspecified whether sciatica present  -     ketorolac injection 60 mg  -     methylPREDNISolone acetate injection 40 mg  -     dexAMETHasone injection 4 mg  L/s spine ordered   -FINDINGS:  The lumbar vertebra are normally aligned.  Degenerative disc changes are present at L4-L5 and to a lesser degree at L3-L4.  No subluxation is seen.  SI joints are normal.     There are surgical clips in the right upper quadrant.  Air-filled loops of small bowel are noted in the upper abdomen without a suggestion of an obstruction at this time.     Impression:     Degenerative disc changes are present at L3-4 and L4-5.  - referral to PT          Chronic conditions status updated as per HPI.  Other than changes above, cont current medications and maintain follow up with specialists.  Return to clinic as previously schedulded.    Patient Instructions   Protect your back.  Limit sports, twisting, and heavy lifting until you are fully recovered.  Practice good posture to lower pressure on your spine.  When lifting, hold the object close to your body, keep your back straight, and use your leg muscles to slowly stand.  Do not sit or  one position for a long time. You may want to sleep with a pillow under or between your knees if this eases your pain.  You may want to take medicine like ibuprofen or naproxen  for swelling and pain. These are nonsteroidal anti-inflammatory drugs (NSAIDs).  A lumbar support belt may help you be more comfortable. This supports your pelvis and eases pain.     Diane Almeida, FNP-BC  [facility]

## 2024-05-06 NOTE — TELEPHONE ENCOUNTER
----- Message from PHIL Russell sent at 5/6/2024  1:46 PM CDT -----  XRAY ABNORMAL- Your Xray showed Degenerative disc changes - wear and tear.    Ask pt if he's agreeable to a physical therapy, pain management, and or spine referral.  Let us know if he wants to use the same doctor that completed his surgery in the past if he's agreeable to a referral and ask the name of that provider.

## 2024-05-06 NOTE — PATIENT INSTRUCTIONS
Protect your back.  Limit sports, twisting, and heavy lifting until you are fully recovered.  Practice good posture to lower pressure on your spine.  When lifting, hold the object close to your body, keep your back straight, and use your leg muscles to slowly stand.  Do not sit or  one position for a long time. You may want to sleep with a pillow under or between your knees if this eases your pain.  You may want to take medicine like ibuprofen or naproxen for swelling and pain. These are nonsteroidal anti-inflammatory drugs (NSAIDs).  A lumbar support belt may help you be more comfortable. This supports your pelvis and eases pain.

## 2024-05-08 DIAGNOSIS — Z12.11 ENCOUNTER FOR SCREENING COLONOSCOPY: Primary | ICD-10-CM

## 2024-05-08 RX ORDER — POLYETHYLENE GLYCOL 3350, SODIUM SULFATE ANHYDROUS, SODIUM BICARBONATE, SODIUM CHLORIDE, POTASSIUM CHLORIDE 236; 22.74; 6.74; 5.86; 2.97 G/4L; G/4L; G/4L; G/4L; G/4L
4 POWDER, FOR SOLUTION ORAL ONCE
Qty: 4000 ML | Refills: 0 | Status: SHIPPED | OUTPATIENT
Start: 2024-05-08 | End: 2024-05-08

## 2024-05-12 DIAGNOSIS — G89.29 CHRONIC BILATERAL LOW BACK PAIN, UNSPECIFIED WHETHER SCIATICA PRESENT: Primary | ICD-10-CM

## 2024-05-12 DIAGNOSIS — M54.50 CHRONIC BILATERAL LOW BACK PAIN, UNSPECIFIED WHETHER SCIATICA PRESENT: Primary | ICD-10-CM

## 2024-05-22 ENCOUNTER — PATIENT OUTREACH (OUTPATIENT)
Dept: ADMINISTRATIVE | Facility: HOSPITAL | Age: 49
End: 2024-05-22

## 2024-05-22 NOTE — PROGRESS NOTES
Population Health Chart Review & Patient Outreach Details  CMH! Provider CMH! Benefit Start Date CMH! Benefit End Date Baseline Risk Track(s) Baseline Risk Level Current Risk Tracks(s) Current Risk Level   JOHN SANTAMARIA VINCENZO NP 04/15/2024 2025 Hypertension, Cholesterol Moderate Hypertension, Cholesterol Moderate     Pt needs appt for [facility] in August,  sent to [facility] to schedule via secure chat    Further Action Needed If Patient Returns Outreach:            Updates Requested / Reviewed:     []  Care Everywhere    []     []  External Sources ([facility], [facility], DIS, etc.)    [] [facility]   [] [facility]   [] Other:    []  Care Team Updated   []  Removed  or Duplicate Orders   []  Immunization Reconciliation Completed / Queried    [] [place]   [] [place]   [] [place]   [] [place]      Health Maintenance Topics Addressed and Outreach Outcomes / Actions Taken:             Breast Cancer Screening []  Mammogram Order Placed    []  Mammogram Screening Scheduled    []  External Records Requested & Care Team Updated if Applicable    []  External Records Uploaded & Care Team Updated if Applicable    []  Pt Declined Scheduling Mammogram    []  Pt Will Schedule with External Provider / Order Routed & Care Team Updated if Applicable              Cervical Cancer Screening []  Pap Smear Scheduled in Primary Care or OBGYN    []  External Records Requested & Care Team Updated if Applicable       []  External Records Uploaded, Care Team Updated, & History Updated if Applicable    []  Patient Declined Scheduling Pap Smear    []  Patient Will Schedule with External Provider & Care Team Updated if Applicable                  Colorectal Cancer Screening []  Colonoscopy Case Request / Referral / Home Test Order Placed    []  External Records Requested & Care Team Updated if Applicable    []  External Records Uploaded, Care Team Updated, & History Updated if Applicable    []  Patient Declined Completing Colon Cancer  Screening    []  Patient Will Schedule with External Provider & Care Team Updated if Applicable    []  Fit Kit Mailed (add the SmartPhrase under additional notes)    []  Reminded Patient to Complete Home Test                Diabetic Eye Exam []  Eye Exam Screening Order Placed    []  Eye Camera Scheduled or Optometry/Ophthalmology Referral Placed    []  External Records Requested & Care Team Updated if Applicable    []  External Records Uploaded, Care Team Updated, & History Updated if Applicable    []  Patient Declined Scheduling Eye Exam    []  Patient Will Schedule with External Provider & Care Team Updated if Applicable             Blood Pressure Control []  Primary Care Follow Up Visit Scheduled     []  Remote Blood Pressure Reading Captured    []  Patient Declined Remote Reading or Scheduling Appt - Escalated to PCP    []  Patient Will Call Back or Send Portal Message with Reading                 HbA1c & Other Labs []  Overdue Lab(s) Ordered    []  Overdue Lab(s) Scheduled    []  External Records Uploaded & Care Team Updated if Applicable    []  Primary Care Follow Up Visit Scheduled     []  Reminded Patient to Complete A1c Home Test    []  Patient Declined Scheduling Labs or Will Call Back to Schedule    []  Patient Will Schedule with External Provider / Order Routed, & Care Team Updated if Applicable           Primary Care Appointment []  Primary Care Appt Scheduled    []  Patient Declined Scheduling or Will Call Back to Schedule    []  Pt Established with External Provider, Updated Care Team, & Informed Pt to Notify Payor if Applicable           Medication Adherence /    Statin Use []  Primary Care Appointment Scheduled    []  Patient Reminded to  Prescription    []  Patient Declined, Provider Notified if Needed    []  Sent Provider Message to Review to Evaluate Pt for Statin, Add Exclusion Dx Codes, Document   Exclusion in Problem List, Change Statin Intensity Level to Moderate or High Intensity if  Applicable                Osteoporosis Screening []  Dexa Order Placed    []  Dexa Appointment Scheduled    []  External Records Requested & Care Team Updated    []  External Records Uploaded, Care Team Updated, & History Updated if Applicable    []  Patient Declined Scheduling Dexa or Will Call Back to Schedule    []  Patient Will Schedule with External Provider / Order Routed & Care Team Updated if Applicable       Additional Notes:

## 2024-10-25 ENCOUNTER — PATIENT MESSAGE (OUTPATIENT)
Dept: RESEARCH | Facility: HOSPITAL | Age: 49
End: 2024-10-25

## 2024-12-18 ENCOUNTER — LAB VISIT (OUTPATIENT)
Dept: PRIMARY CARE CLINIC | Facility: CLINIC | Age: 49
End: 2024-12-18

## 2024-12-18 ENCOUNTER — HOSPITAL ENCOUNTER (EMERGENCY)
Facility: HOSPITAL | Age: 49
Discharge: HOME | End: 2024-12-18
Attending: EMERGENCY MEDICINE
Payer: OTHER MISCELLANEOUS

## 2024-12-18 VITALS
RESPIRATION RATE: 14 BRPM | HEART RATE: 94 BPM | DIASTOLIC BLOOD PRESSURE: 88 MMHG | OXYGEN SATURATION: 96 % | SYSTOLIC BLOOD PRESSURE: 147 MMHG | HEIGHT: 68 IN | TEMPERATURE: 98 F | BODY MASS INDEX: 24.25 KG/M2 | WEIGHT: 160 LBS

## 2024-12-18 DIAGNOSIS — S01.01XA LACERATION OF SCALP, INITIAL ENCOUNTER: Primary | ICD-10-CM

## 2024-12-18 DIAGNOSIS — S06.0X1A CONCUSSION WITH LOSS OF CONSCIOUSNESS OF 30 MINUTES OR LESS, INITIAL ENCOUNTER: ICD-10-CM

## 2024-12-18 DIAGNOSIS — Z02.83 ENCOUNTER FOR DRUG SCREENING: Primary | ICD-10-CM

## 2024-12-18 LAB
ALBUMIN SERPL BCP-MCNC: 4.3 G/DL (ref 3.5–5)
ALBUMIN/GLOB SERPL: 1.2 {RATIO}
ALP SERPL-CCNC: 75 U/L (ref 40–150)
ALT SERPL W P-5'-P-CCNC: 119 U/L
AMPHET UR QL SCN: NEGATIVE
ANION GAP SERPL CALCULATED.3IONS-SCNC: 25 MMOL/L (ref 7–16)
APTT PPP: 24.8 SECONDS (ref 25.2–37.3)
AST SERPL W P-5'-P-CCNC: 219 U/L (ref 5–34)
BARBITURATES UR QL SCN: NEGATIVE
BASOPHILS # BLD AUTO: 0.09 K/UL (ref 0–0.2)
BASOPHILS NFR BLD AUTO: 1.4 % (ref 0–1)
BENZODIAZ METAB UR QL SCN: NEGATIVE
BILIRUB SERPL-MCNC: 1.3 MG/DL
BUN SERPL-MCNC: 4 MG/DL (ref 9–21)
BUN/CREAT SERPL: 4 (ref 6–20)
CALCIUM SERPL-MCNC: 8.6 MG/DL (ref 8.4–10.2)
CANNABINOIDS UR QL SCN: NEGATIVE
CHLORIDE SERPL-SCNC: 102 MMOL/L (ref 98–107)
CO2 SERPL-SCNC: 15 MMOL/L (ref 22–29)
COCAINE UR QL SCN: NEGATIVE
CREAT SERPL-MCNC: 0.9 MG/DL (ref 0.72–1.25)
DIFFERENTIAL METHOD BLD: ABNORMAL
EGFR (NO RACE VARIABLE) (RUSH/TITUS): 106 ML/MIN/1.73M2
EOSINOPHIL # BLD AUTO: 0.2 K/UL (ref 0–0.5)
EOSINOPHIL NFR BLD AUTO: 3.2 % (ref 1–4)
ERYTHROCYTE [DISTWIDTH] IN BLOOD BY AUTOMATED COUNT: 11.9 % (ref 11.5–14.5)
ETHANOL SERPL-MCNC: <10 MG/DL
GLOBULIN SER-MCNC: 3.5 G/DL (ref 2–4)
GLUCOSE SERPL-MCNC: 111 MG/DL (ref 74–100)
HCT VFR BLD AUTO: 43.4 % (ref 40–54)
HGB BLD-MCNC: 14.4 G/DL (ref 13.5–18)
IMM GRANULOCYTES # BLD AUTO: 0.03 K/UL (ref 0–0.04)
IMM GRANULOCYTES NFR BLD: 0.5 % (ref 0–0.4)
INR BLD: 1.04
LYMPHOCYTES # BLD AUTO: 1.39 K/UL (ref 1–4.8)
LYMPHOCYTES NFR BLD AUTO: 22.2 % (ref 27–41)
MCH RBC QN AUTO: 34.2 PG (ref 27–31)
MCHC RBC AUTO-ENTMCNC: 33.2 G/DL (ref 32–36)
MCV RBC AUTO: 103.1 FL (ref 80–96)
MONOCYTES # BLD AUTO: 0.67 K/UL (ref 0–0.8)
MONOCYTES NFR BLD AUTO: 10.7 % (ref 2–6)
MPC BLD CALC-MCNC: 10.3 FL (ref 9.4–12.4)
NEUTROPHILS # BLD AUTO: 3.89 K/UL (ref 1.8–7.7)
NEUTROPHILS NFR BLD AUTO: 62 % (ref 53–65)
NRBC # BLD AUTO: 0 X10E3/UL
NRBC, AUTO (.00): 0 %
OPIATES UR QL SCN: NEGATIVE
PCP UR QL SCN: NEGATIVE
PLATELET # BLD AUTO: 118 K/UL (ref 150–400)
POTASSIUM SERPL-SCNC: 3.3 MMOL/L (ref 3.5–5.1)
PROT SERPL-MCNC: 7.8 G/DL (ref 6.4–8.3)
PROTHROMBIN TIME: 13.5 SECONDS (ref 11.7–14.7)
RBC # BLD AUTO: 4.21 M/UL (ref 4.6–6.2)
SODIUM SERPL-SCNC: 139 MMOL/L (ref 136–145)
WBC # BLD AUTO: 6.27 K/UL (ref 4.5–11)

## 2024-12-18 PROCEDURE — 85025 COMPLETE CBC W/AUTO DIFF WBC: CPT | Performed by: EMERGENCY MEDICINE

## 2024-12-18 PROCEDURE — 12001 RPR S/N/AX/GEN/TRNK 2.5CM/<: CPT

## 2024-12-18 PROCEDURE — 25000003 PHARM REV CODE 250: Performed by: EMERGENCY MEDICINE

## 2024-12-18 PROCEDURE — 99000 SPECIMEN HANDLING OFFICE-LAB: CPT | Mod: ,,, | Performed by: NURSE PRACTITIONER

## 2024-12-18 PROCEDURE — 36415 COLL VENOUS BLD VENIPUNCTURE: CPT | Performed by: EMERGENCY MEDICINE

## 2024-12-18 PROCEDURE — 85610 PROTHROMBIN TIME: CPT | Performed by: EMERGENCY MEDICINE

## 2024-12-18 PROCEDURE — 85730 THROMBOPLASTIN TIME PARTIAL: CPT | Performed by: EMERGENCY MEDICINE

## 2024-12-18 PROCEDURE — 82077 ASSAY SPEC XCP UR&BREATH IA: CPT | Performed by: EMERGENCY MEDICINE

## 2024-12-18 PROCEDURE — 80053 COMPREHEN METABOLIC PANEL: CPT | Performed by: EMERGENCY MEDICINE

## 2024-12-18 PROCEDURE — 99284 EMERGENCY DEPT VISIT MOD MDM: CPT | Mod: 25

## 2024-12-18 PROCEDURE — 80307 DRUG TEST PRSMV CHEM ANLYZR: CPT | Performed by: EMERGENCY MEDICINE

## 2024-12-18 RX ADMIN — BACITRACIN ZINC, NEOMYCIN SULFATE , POLYMYXIN B SULFATE. 1 EACH: 400; 3.5; 5 OINTMENT TOPICAL at 12:12

## 2024-12-18 NOTE — Clinical Note
"Robert"Wilbur Sanders was seen and treated in our emergency department on 12/18/2024.  He may return to work on 12/23/2024.       If you have any questions or concerns, please don't hesitate to call.      Neymar Love MD"

## 2024-12-18 NOTE — DISCHARGE INSTRUCTIONS
Follow up with primary care and with Neurology    Concussion care including brain rest for 24 hours as we discussed.  Work release being given until Monday.  Gradually increase activity as tolerated.    Return the ER if symptoms are worsening or new symptoms develop    Return in 10 days for staple removal

## 2024-12-18 NOTE — ED PROVIDER NOTES
Encounter Date: 12/18/2024       History     Chief Complaint   Patient presents with    Fall     Pt presents to ed with c/o falling today at work and patient does not recall any events that have happened today. Fellow employee brought him to the hospital due to confusion and laceration to back of his head from falling     History per his co-worker.  It was said that his co-worker that he presumably fell at [employer] today.  He has the  there.  He was not witnessed falling and patient has suspected retrograde amnesia because he does not remember going to work today.  Patient had come up to get some help with some bleeding coming from his right parietal temporal area.  It is not suspected that he would have fallen from a height      Review of patient's allergies indicates:  No Known Allergies  Past Medical History:   Diagnosis Date    Allergy      Past Surgical History:   Procedure Laterality Date    ADENOIDECTOMY      ARTHROSCOPIC CHONDROPLASTY OF KNEE JOINT Left 03/01/2023    Procedure: ARTHROSCOPY, KNEE, WITH CHONDROPLASTY;  Surgeon: Garcia Quiroz MD;  Location: [facility] OR;  Service: Orthopedics;  Laterality: Left;    KNEE ARTHROSCOPY W/ MENISCECTOMY Left 03/01/2023    Procedure: ARTHROSCOPY, KNEE, WITH MENISCECTOMY;  Surgeon: Garcia Quiroz MD;  Location: [facility] OR;  Service: Orthopedics;  Laterality: Left;     No family history on file.  Social History     Tobacco Use    Smoking status: Every Day     Current packs/day: 0.50     Types: Cigarettes, Vaping with nicotine    Smokeless tobacco: Never    Tobacco comments:     Cigarettes are down to 0.25 packs/day   Substance Use Topics    Alcohol use: Yes    Drug use: Never     Review of Systems   Constitutional:  Negative for fever.   HENT:  Negative for sore throat.    Respiratory:  Negative for shortness of breath.    Cardiovascular:  Negative for chest pain.   Gastrointestinal:  Negative for nausea.   Genitourinary:  Negative for  dysuria.   Musculoskeletal:  Negative for back pain.   Skin:  Negative for rash.   Neurological:  Negative for weakness.   Hematological:  Does not bruise/bleed easily.       Physical Exam     Initial Vitals   BP Pulse Resp Temp SpO2   12/18/24 0958 12/18/24 0958 12/18/24 1004 12/18/24 1004 12/18/24 0958   (!) 159/97 103 12 98 °F (36.7 °C) 97 %      MAP       --                Physical Exam    Nursing note and vitals reviewed.  Constitutional: He appears well-developed and well-nourished.   HENT:   Head: Normocephalic and atraumatic.   6 mm laceration right temporoparietal area of scalp   Eyes: EOM are normal. Pupils are equal, round, and reactive to light.   Neck: Neck supple. No thyromegaly present. No JVD present.   Normal range of motion.  Cardiovascular:  Normal rate, regular rhythm, normal heart sounds and intact distal pulses.           No murmur heard.  Pulmonary/Chest: Breath sounds normal. No stridor. No respiratory distress. He has no wheezes.   Abdominal: Abdomen is soft. Bowel sounds are normal. He exhibits no distension. There is no abdominal tenderness.   Musculoskeletal:         General: No tenderness or edema. Normal range of motion.      Cervical back: Normal range of motion and neck supple.     Lymphadenopathy:     He has no cervical adenopathy.   Neurological: He is alert. He has normal strength. No cranial nerve deficit or sensory deficit. GCS score is 15. GCS eye subscore is 4. GCS verbal subscore is 5. GCS motor subscore is 6.   Oriented to person and place.  Not oriented to the day but knows the month.   Skin: Skin is warm and dry. Capillary refill takes less than 2 seconds. No rash noted.   Psychiatric: He has a normal mood and affect.         Medical Screening Exam   See Full Note    ED Course   Lac Repair    Date/Time: 12/18/2024 12:17 PM    Performed by: Neymar Love MD  Authorized by: Neymar Love MD    Consent:     Consent obtained:  Verbal    Consent given by:   Patient  Anesthesia:     Anesthesia method:  Local infiltration  Laceration details:     Location:  Scalp    Scalp location:  R temporal    Length (cm):  1  Exploration:     Contaminated: no    Treatment:     Irrigation solution:  Sterile saline    Debridement:  None    Undermining:  None  Skin repair:     Repair method:  Staples    Number of staples:  2  Approximation:     Approximation:  Close  Repair type:     Repair type:  Simple  Post-procedure details:     Dressing:  Open (no dressing)    Labs Reviewed   COMPREHENSIVE METABOLIC PANEL - Abnormal       Result Value    Sodium 139      Potassium 3.3 (*)     Chloride 102      CO2 15 (*)     Anion Gap 25 (*)     Glucose 111 (*)     BUN 4 (*)     Creatinine 0.90      BUN/Creatinine Ratio 4 (*)     Calcium 8.6      Total Protein 7.8      Albumin 4.3      Globulin 3.5      A/G Ratio 1.2      Bilirubin, Total 1.3      Alk Phos 75       (*)      (*)     eGFR 106     APTT - Abnormal    PTT 24.8 (*)    CBC WITH DIFFERENTIAL - Abnormal    WBC 6.27      RBC 4.21 (*)     Hemoglobin 14.4      Hematocrit 43.4      .1 (*)     MCH 34.2 (*)     MCHC 33.2      RDW 11.9      Platelet Count 118 (*)     MPV 10.3      Neutrophils % 62.0      Lymphocytes % 22.2 (*)     Monocytes % 10.7 (*)     Eosinophils % 3.2      Basophils % 1.4 (*)     Immature Granulocytes % 0.5 (*)     nRBC, Auto 0.0      Neutrophils, Abs 3.89      Lymphocytes, Absolute 1.39      Monocytes, Absolute 0.67      Eosinophils, Absolute 0.20      Basophils, Absolute 0.09      Immature Granulocytes, Absolute 0.03      nRBC, Absolute 0.00      Diff Type Auto     PROTIME-INR - Normal    PT 13.5      INR 1.04     ALCOHOL,MEDICAL (ETHANOL) - Normal    Ethanol <10     DRUG SCREEN, URINE (BEAKER) - Normal    Barbiturates, Urine Negative      Benzodiazepine, Urine Negative      Opiates, Urine Negative      Phencyclidine, Urine Negative      Amphetamine, Urine Negative      Cannabinoid, Urine Negative       Cocaine, Urine Negative      Narrative:     This screen includes the following classes of drugs at the listed cut-off:    Benzodiazepines 200 ng/ml  Cocaine metabolite 300 ng/ml  Opiates 2000 ng/ml  Barbiturates 200 ng/ml  Amphetamines 500 ng/ml  Marijuana metabs (THC) 50 ng/ml  Phencyclidine (PCP) 25 ng/ml    This is a screening test. If results do not correlate with clinical presentation, then a confirmatory send out test is advised.   CBC W/ AUTO DIFFERENTIAL    Narrative:     The following orders were created for panel order CBC auto differential.  Procedure                               Abnormality         Status                     ---------                               -----------         ------                     CBC with Differential[[id number]]       Abnormal            Final result                 Please view results for these tests on the individual orders.          Imaging Results              CT Head Without Contrast (Final result)  Result time 12/18/24 10:37:54      Final result by Lora Wilson MD (12/18/24 10:37:54)                   Impression:      1. Normal CT appearance of the brain.      Electronically signed by: Lora Wilson  Date:    12/18/2024  Time:    10:37               Narrative:    EXAMINATION:  CT HEAD WITHOUT CONTRAST    CLINICAL HISTORY:  Head trauma, abnormal mental status (Age 19-64y);.    TECHNIQUE:  CMS MANDATED QUALITY DATA - CT RADIATION - 436    All CT scans at this facility utilize dose modulation, iterative reconstruction, and/or weight based dosing when appropriate to reduce radiation dose to as low as reasonably achievable.    Non infusion images were obtained from the skull base to the vertex.    COMPARISON:  09/19/2024    FINDINGS:  There is no evidence of intracranial mass, hemorrhage, or midline shift.  The ventricles and sulci are within normal limits.  There are no pathologic extra-axial fluid collections.    There is no evidence of ischemic change or  edema.  Cerebellum and brainstem are unremarkable.  The orbits are unremarkable.    The calvarium is intact.  The paranasal sinuses and mastoid air cells are clear.                                       CT Cervical Spine Without Contrast (Final result)  Result time 12/18/24 10:47:42      Final result by Lora Wilson MD (12/18/24 10:47:42)                   Impression:      No evidence of acute fracture or traumatic malalignment of the cervical spine.      Electronically signed by: Lora Wilson  Date:    12/18/2024  Time:    10:47               Narrative:    EXAMINATION:  CT CERVICAL SPINE WITHOUT CONTRAST    CLINICAL HISTORY:  Polytrauma, blunt;fall, confusion, retrograde amnesia;    TECHNIQUE:  Low dose axial CT images through the cervical spine, with sagittal and coronal reformations.  Contrast was not administered.    COMPARISON:  None    FINDINGS:  The vertebral bodies are normal in height and morphology without evidence of fracture or osseous destructive process.  Normal sagittal alignment is preserved.  The odontoid process is intact and the cranial cervical junction is normal.  The lateral masses of C1 are symmetrical.    Pints mild disc space narrowing anterior spurring at C4-5.  The facet joints are aligned.    Limited evaluation of the intraspinal contents demonstrates no hematoma or mass.Paraspinal soft tissues exhibit no acute abnormalities.                                       Medications   neomycin-bacitracnZn-polymyxnB packet (1 each Topical (Top) Given 12/18/24 1231)     Medical Decision Making             ED Course as of 12/18/24 1359   Wed Dec 18, 2024   1215 CT head shows no intracranial hemorrhage.  Patient's NIH stroke scale score is 0.  The laceration was repaired with staples in the ED. patient had some questionable seizure activity after the fall that lasted for less than a minute.  Patient laid on the ground for 3 minutes.  This is based on his coworkers who reviewed the video  footage.  Patient is also has had some previous problems with mobility and with weakness to the right leg has had  back surgery several months ago.  Awake alert nontoxic appearing. [PK]      ED Course User Index  [PK] Neymar Love MD                           Clinical Impression:   Final diagnoses:  [S01.01XA] Laceration of scalp, initial encounter (Primary)  [S06.0X1A] Concussion with loss of consciousness of 30 minutes or less, initial encounter        ED Disposition Condition    Discharge Stable          ED Prescriptions    None       Follow-up Information       Follow up With Specialties Details Why Contact Info    [facility] - Emergency Department Emergency Medicine Go to  As needed, If symptoms worsen [address] [place] [id number] [phone]    [facility] - Emergency Department Emergency Medicine In 10 days  [address] [place] [id number] [phone]             Neymar Love MD  12/18/24 8390

## 2024-12-18 NOTE — PROGRESS NOTES
Patient ID: Robert Sanders is a 49 y.o. male.    Chief Complaint: No chief complaint on file.    History of Present Illness              Physical Exam              Assessment & Plan               1. Encounter for drug screening        No follow-ups on file.    This note was generated with the assistance of ambient listening technology. Verbal consent was obtained by the patient and accompanying visitor(s) for the recording of patient appointment to facilitate this note. I attest to having reviewed and edited the generated note for accuracy, though some syntax or spelling errors may persist. Please contact the author of this note for any clarification.

## 2024-12-19 ENCOUNTER — TELEPHONE (OUTPATIENT)
Dept: EMERGENCY MEDICINE | Facility: HOSPITAL | Age: 49
End: 2024-12-19
Payer: COMMERCIAL

## 2024-12-23 ENCOUNTER — OFFICE VISIT (OUTPATIENT)
Dept: NEUROLOGY | Facility: CLINIC | Age: 49
End: 2024-12-23
Payer: COMMERCIAL

## 2024-12-23 VITALS
OXYGEN SATURATION: 98 % | HEART RATE: 101 BPM | BODY MASS INDEX: 23.11 KG/M2 | HEIGHT: 69 IN | SYSTOLIC BLOOD PRESSURE: 166 MMHG | DIASTOLIC BLOOD PRESSURE: 112 MMHG | RESPIRATION RATE: 18 BRPM | WEIGHT: 156 LBS

## 2024-12-23 DIAGNOSIS — S06.0X1A CONCUSSION WITH LOSS OF CONSCIOUSNESS OF 30 MINUTES OR LESS, INITIAL ENCOUNTER: ICD-10-CM

## 2024-12-23 DIAGNOSIS — R56.9 WITNESSED SEIZURE-LIKE ACTIVITY: Primary | ICD-10-CM

## 2024-12-23 PROCEDURE — 3077F SYST BP >= 140 MM HG: CPT | Mod: S$GLB,,, | Performed by: PSYCHIATRY & NEUROLOGY

## 2024-12-23 PROCEDURE — 3008F BODY MASS INDEX DOCD: CPT | Mod: S$GLB,,, | Performed by: PSYCHIATRY & NEUROLOGY

## 2024-12-23 PROCEDURE — 3080F DIAST BP >= 90 MM HG: CPT | Mod: S$GLB,,, | Performed by: PSYCHIATRY & NEUROLOGY

## 2024-12-23 PROCEDURE — 99999 PR PBB SHADOW E&M-EST. PATIENT-LVL V: CPT | Mod: PBBFAC,,, | Performed by: PSYCHIATRY & NEUROLOGY

## 2024-12-23 PROCEDURE — 1159F MED LIST DOCD IN RCRD: CPT | Mod: S$GLB,,, | Performed by: PSYCHIATRY & NEUROLOGY

## 2024-12-23 PROCEDURE — 99204 OFFICE O/P NEW MOD 45 MIN: CPT | Mod: S$GLB,,, | Performed by: PSYCHIATRY & NEUROLOGY

## 2024-12-23 RX ORDER — LANOLIN ALCOHOL/MO/W.PET/CERES
1000 CREAM (GRAM) TOPICAL
COMMUNITY

## 2024-12-23 RX ORDER — BUPROPION HYDROCHLORIDE 150 MG/1
300 TABLET ORAL
COMMUNITY
Start: 2024-11-26

## 2024-12-23 RX ORDER — HYDROXYZINE PAMOATE 25 MG/1
25 CAPSULE ORAL
COMMUNITY
Start: 2024-10-23

## 2024-12-23 RX ORDER — FERROUS SULFATE 325(65) MG
325 TABLET, DELAYED RELEASE (ENTERIC COATED) ORAL
COMMUNITY

## 2024-12-23 RX ORDER — CELECOXIB 200 MG/1
200 CAPSULE ORAL 2 TIMES DAILY
COMMUNITY
Start: 2024-11-26

## 2024-12-23 RX ORDER — SENNOSIDES 8.6 MG
71.5 TABLET ORAL
COMMUNITY

## 2024-12-23 NOTE — PATIENT INSTRUCTIONS
Caution with altering meds   Caution w/ excess ETOH and cannabis - now tapering down to off  Recommend cont [facility]hav [facility] for JUSTIN  EEG extended   F/u 4 weeks

## 2024-12-23 NOTE — PROGRESS NOTES
Subjective:       Patient ID: Robert Sanders is a 49 y.o. male     Chief Complaint:    Chief Complaint   Patient presents with    possible concussion     Pt. States feet feel like stepping on pins.pt. states had 3 seizures Wednesday.        Allergies:  Patient has no known allergies.    Current Medications:    Outpatient Encounter Medications as of 12/23/2024   Medication Sig Dispense Refill    buPROPion (WELLBUTRIN XL) 150 MG TB24 tablet Take 300 mg by mouth.      celecoxib (CELEBREX) 200 MG capsule Take 200 mg by mouth 2 (two) times daily.      clonazePAM (KLONOPIN) 1 MG tablet Take 1 tablet (1 mg total) by mouth daily as needed for Anxiety. 30 tablet 1    cyanocobalamin (VITAMIN B-12) 1000 MCG tablet Take 1,000 mcg by mouth.      ferrous sulfate 325 (65 FE) MG EC tablet Take 325 mg by mouth.      hydrOXYzine pamoate (VISTARIL) 25 MG Cap Take 25 mg by mouth.      ibuprofen (ADVIL,MOTRIN) 800 MG tablet Take 1 tablet (800 mg total) by mouth every 8 (eight) hours as needed for Pain or Temperature greater than. 30 tablet 0    ipratropium (ATROVENT) 21 mcg (0.03 %) nasal spray 2 sprays by Each Nostril route 2 (two) times daily. 30 mL 0    magnesium chloride (SLOW-MAG) 71.5 mg TbEC Take 71.5 mg by mouth.      omeprazole (PRILOSEC) 20 MG capsule Take 20 mg by mouth once daily.      pediatric multivit-iron-min Chew Take 1 tablet by mouth.      simvastatin (ZOCOR) 10 MG tablet Take 1 tablet (10 mg total) by mouth every evening. 90 tablet 1    VITAMIN B COMPLEX ORAL Take 1 tablet by mouth.      cephALEXin (KEFLEX) 500 MG capsule Take 500 mg by mouth 3 (three) times daily. (Patient not taking: Reported on 12/23/2024)       No facility-administered encounter medications on file as of 12/23/2024.       History of Present Illness  50 yo WM referred after GL fall and LOC w/ laceration to back of his head week or so ago at his work ( first time back to work after 7 months due to spinal surgeries and Rehab in  [place])  w/ amnesia of event -known hx ETOH and cannabis abuse, gen anxiety disorder and  currnelty on benzos which could certainly given above exacerbate tendency to "pass out" and secondarily express "seizure -like events" - He had just b een on Paxil prev and witnessed doing peculiar behaviors hallucinating and he stopped such right before this  event ?  Alcohol level on 9/18/24 = 268 and UDS positive for cannabis  Similar event 3 months ago and all CT head NL and negative   Hx mult MSK/Ortho issues in knees and prev spinal surgeries   Admitted to fifth of liquor daily - prev a half gallon and attempting to cut back currently and seeing Behav Health now for JUSTIN, depression and addictions   He states he began drinking to self medicate from spinal surgeries   Now doing therapy for JUSTIN,depression and addictions                    Past Medical History:   Diagnosis Date    Allergy        Past Surgical History:   Procedure Laterality Date    ADENOIDECTOMY      ARTHROSCOPIC CHONDROPLASTY OF KNEE JOINT Left 03/01/2023    Procedure: ARTHROSCOPY, KNEE, WITH CHONDROPLASTY;  Surgeon: Garcia Quiroz MD;  Location: [facility];  Service: Orthopedics;  Laterality: Left;    KNEE ARTHROSCOPY W/ MENISCECTOMY Left 03/01/2023    Procedure: ARTHROSCOPY, KNEE, WITH MENISCECTOMY;  Surgeon: Garcia Quiroz MD;  Location: [facility];  Service: Orthopedics;  Laterality: Left;       Social History  Mr. Sanders  reports that he has been smoking cigarettes and vaping with nicotine. He has never used smokeless tobacco. He reports current alcohol use. He reports that he does not use drugs.    Family History  Mr.'s Sanders family history is not on file.    Review of Systems  Review of Systems   Musculoskeletal:  Positive for back pain, falls and joint pain.   Neurological:  Positive for loss of consciousness.   Psychiatric/Behavioral:  Positive for depression and substance abuse. The patient is nervous/anxious.    All other systems " "reviewed and are negative.     Objective:   BP (!) 166/112 (BP Location: Right arm, Patient Position: Sitting)   Pulse 101   Resp 18   Ht 5' 9" (1.753 m)   Wt 70.8 kg (156 lb)   SpO2 98%   BMI 23.04 kg/m²    NEUROLOGICAL EXAMINATION:     MENTAL STATUS   Oriented to person, place, and time.   Attention: normal. Concentration: normal.   Level of consciousness: alert  Knowledge: good.        anxiety     CRANIAL NERVES   Cranial nerves II through XII intact.     MOTOR EXAM     Strength   Strength 5/5 throughout.     GAIT AND COORDINATION     Gait  Gait: normal       Physical Exam  Vitals reviewed.   Constitutional:       Appearance: He is normal weight.   Neurological:      General: No focal deficit present.      Mental Status: He is alert and oriented to person, place, and time. Mental status is at baseline.      Cranial Nerves: Cranial nerves 2-12 are intact.      Motor: Motor strength is normal.     Gait: Gait is intact.        Assessment:     Concussion with loss of consciousness of 30 minutes or less, initial encounter  Comments:  poss seizure event  Orders:  -     Ambulatory referral/consult to Neurology         Primary Diagnosis and ICD10  Concussion with loss of consciousness of 30 minutes or less, initial encounter [S06.0X1A]    Plan:     Patient Instructions   Caution with altering meds   Caution w/ excess ETOH and cannabis   Recommend     There are no discontinued medications.    Requested Prescriptions      No prescriptions requested or ordered in this encounter       "

## (undated) DEVICE — DRESSING XEROFORM NONADH 1X8IN

## (undated) DEVICE — TOURNIQUET SB QC SP 34X4IN

## (undated) DEVICE — CANISTER SUCTION MEDI-VAC 12L

## (undated) DEVICE — SHAVER SYNNOVATOR PLAT SERIES 4.5MM

## (undated) DEVICE — WRAP KNEE 4 ICE PACK L XL

## (undated) DEVICE — TUBING CROSSFLOW INFLOW CASS

## (undated) DEVICE — SOL NACL IRR 3000ML

## (undated) DEVICE — SUT ETHILON 3-0 PS2 18 BLK

## (undated) DEVICE — GLOVE 6.0 PROTEXIS PI BLUE

## (undated) DEVICE — COVER PROXIMA MAYO STAND

## (undated) DEVICE — NDL HYPODERMIC SAFETY 25G 1IN

## (undated) DEVICE — TUBE SUCTION MEDI-VAC STERILE

## (undated) DEVICE — GLOVE BIOGEL SKINSENSE PI 8.0

## (undated) DEVICE — WAND COBLATION WEREWOLF FLOW 50

## (undated) DEVICE — BANDAGE PRCAR COMPR 11YDX6IN

## (undated) DEVICE — GLOVE BIOGEL SKINSENSE PI 7.0

## (undated) DEVICE — SOLIDIFIER BTL W/TREAT 1500CC

## (undated) DEVICE — APPLICATOR CHLORAPREP ORN 26ML

## (undated) DEVICE — GLOVE 6.5 PROTEXIS PI BLUE

## (undated) DEVICE — DRAPE ARTHSCP T ORTHOMAX POUCH

## (undated) DEVICE — GAUZE SPONGE 4X4 12PLY

## (undated) DEVICE — GLOVE BIOGEL SKINSENSE PI 7.5

## (undated) DEVICE — GLOVE BIOGEL SKINSENSE PI 6.0

## (undated) DEVICE — PAD ABDOMINAL 8X7.5 STERILE

## (undated) DEVICE — PACK KNEE ARTHROSCOPY  RUSH

## (undated) DEVICE — GLOVE 7.0 PROTEXIS PI BLUE

## (undated) DEVICE — SYR 10CC LUER LOCK

## (undated) DEVICE — DEVICE SUCTION H20 BROOM 12FT

## (undated) DEVICE — DRAPE INVISISHIELD U 48X52IN

## (undated) DEVICE — GLOVE BIOGEL SKINSENSE PI 6.5